# Patient Record
Sex: FEMALE | Race: WHITE | Employment: OTHER | URBAN - METROPOLITAN AREA
[De-identification: names, ages, dates, MRNs, and addresses within clinical notes are randomized per-mention and may not be internally consistent; named-entity substitution may affect disease eponyms.]

---

## 2018-09-14 ENCOUNTER — OFFICE VISIT (OUTPATIENT)
Dept: FAMILY MEDICINE CLINIC | Age: 33
End: 2018-09-14
Payer: COMMERCIAL

## 2018-09-14 ENCOUNTER — HOSPITAL ENCOUNTER (OUTPATIENT)
Age: 33
Discharge: HOME | End: 2018-09-16
Payer: COMMERCIAL

## 2018-09-14 VITALS
HEIGHT: 69 IN | HEART RATE: 82 BPM | BODY MASS INDEX: 38.8 KG/M2 | DIASTOLIC BLOOD PRESSURE: 78 MMHG | SYSTOLIC BLOOD PRESSURE: 110 MMHG | RESPIRATION RATE: 16 BRPM | WEIGHT: 262 LBS

## 2018-09-14 DIAGNOSIS — Z01.419 WELL WOMAN EXAM: ICD-10-CM

## 2018-09-14 DIAGNOSIS — E28.2 PCOS (POLYCYSTIC OVARIAN SYNDROME): Primary | ICD-10-CM

## 2018-09-14 PROCEDURE — 87624 HPV HI-RISK TYP POOLED RSLT: CPT

## 2018-09-14 PROCEDURE — 76830 TRANSVAGINAL US NON-OB: CPT | Performed by: FAMILY MEDICINE

## 2018-09-14 PROCEDURE — G0123 SCREEN CERV/VAG THIN LAYER: HCPCS

## 2018-09-14 PROCEDURE — 99213 OFFICE O/P EST LOW 20 MIN: CPT | Performed by: FAMILY MEDICINE

## 2018-09-14 ASSESSMENT — PATIENT HEALTH QUESTIONNAIRE - PHQ9
SUM OF ALL RESPONSES TO PHQ9 QUESTIONS 1 & 2: 0
SUM OF ALL RESPONSES TO PHQ QUESTIONS 1-9: 0
SUM OF ALL RESPONSES TO PHQ QUESTIONS 1-9: 0
2. FEELING DOWN, DEPRESSED OR HOPELESS: 0
SUM OF ALL RESPONSES TO PHQ9 QUESTIONS 1 & 2: 0
SUM OF ALL RESPONSES TO PHQ QUESTIONS 1-9: 0
1. LITTLE INTEREST OR PLEASURE IN DOING THINGS: 0
2. FEELING DOWN, DEPRESSED OR HOPELESS: 0
SUM OF ALL RESPONSES TO PHQ QUESTIONS 1-9: 0
1. LITTLE INTEREST OR PLEASURE IN DOING THINGS: 0

## 2018-09-14 NOTE — PROGRESS NOTES
Last pregnancy after ovulation induction with clomiphene  Delivered by  no complications  Now trying again for about 9-10 months  Periods every 32 days  LMP    July  Urine ovulation test is questionable  No hirsutism no acne  No galactorrhea  Weight prepregnancy was 225 lbs  Now 262 lbs    No pelvic infections or STI  Same partner     Gynecological examination :    General appearance : healthy. Obese No hirsutism or AN. Thyroid : no goiter. Skin tags around the neck and axillae  Breasts : no masses, no skin changes or retraction. No nipple discharge. Lymph nodes : no axillary or supraclavicular lymphadenopathy. Abdomen : soft. No organomegaly or masses. V&V : normal female external genitalia. No lesions or abnormal discharge. BUS : normal. No cysts or discharge. PS : adequate. No cystocele, no rectocele and no uterine prolapse. Cervix : no lesions. Pap done  Uterus : normal size. Adnexa : free. No masses or tenderness. No nodularities in posterior cul-de-sac. Rectal examination : not done. A/P  Chronic anovulation secondary to mild PCO S  Labs  Follow up day 2-5 next cycle                                       [address] [place] [address] Res[facility] Program                                                           Transvaginal Ultrasonography Report          Indication(s) : Irregular menses and chronic anovulation. Findings: The uterus is normal in size, shape and appearance. The midline echo is visualized from the cervix to the fundus. The endometrial stripe appearance is unremarkable and its thickness at the fundus is 8.5 mm . The AP diameter measures 4.7 cm. The longitudinal axis measures 8.7 cm. The transverse diameter measures 4.5 cm. The right ovary was not visualized  The left ovary is normal in size and appearance, measuring 2.5 x 1.8  cm. There are numerous follicles noted . There are no ovarian cysts or masses.   There are no adnexal cysts

## 2018-09-20 LAB
CORRESPONDING PAP CASE #: NORMAL
HPV, HIGH RISK: NEGATIVE

## 2018-10-12 ENCOUNTER — OFFICE VISIT (OUTPATIENT)
Dept: FAMILY MEDICINE CLINIC | Age: 33
End: 2018-10-12
Payer: COMMERCIAL

## 2018-10-12 VITALS — RESPIRATION RATE: 18 BRPM | WEIGHT: 262 LBS | BODY MASS INDEX: 38.69 KG/M2 | HEART RATE: 70 BPM

## 2018-10-12 DIAGNOSIS — E28.2 PCOS (POLYCYSTIC OVARIAN SYNDROME): Primary | ICD-10-CM

## 2018-10-12 PROCEDURE — 99213 OFFICE O/P EST LOW 20 MIN: CPT | Performed by: FAMILY MEDICINE

## 2018-10-12 NOTE — PROGRESS NOTES
Follow up of infertility  History of PCOS  LMP 10 days ago    Vag US  The uterus is normal size shape and configuration. ET 8 mm  Appearance of the endometrium is consistent with late follicular phase with mostly trilaminar appearance and some hyperechogenicity. Dominant follicle left ovary    A/P  Secondary infertility  Progesterone level next week corresponding to a day 20 of the cycle  MVI with folate. We will initiate on medication induction at the next visit. Patient will call on day 1 of her next cycle.

## 2018-10-20 LAB
PROLACTIN: 14.8
TSH SERPL DL<=0.05 MIU/L-ACNC: 2.38 UIU/ML

## 2018-11-20 ENCOUNTER — OFFICE VISIT (OUTPATIENT)
Dept: FAMILY MEDICINE CLINIC | Age: 33
End: 2018-11-20
Payer: COMMERCIAL

## 2018-11-20 DIAGNOSIS — E28.2 PCOS (POLYCYSTIC OVARIAN SYNDROME): Primary | ICD-10-CM

## 2018-11-20 PROCEDURE — 76857 US EXAM PELVIC LIMITED: CPT | Performed by: FAMILY MEDICINE

## 2018-11-20 PROCEDURE — 99212 OFFICE O/P EST SF 10 MIN: CPT | Performed by: FAMILY MEDICINE

## 2018-11-20 NOTE — PROGRESS NOTES
Here for ovulation induction  LMP  Patient fully informed about risks of ovarian cysts and of multiple pregnancy. Agrees to proceed    Vaginal US  Small right ovarian cyst likely CL  Diameter 30 mm  Uterus upper normal size    A/P  Ovcon 35  Follow up day 2-5 of next cycle or prn.

## 2018-12-18 ENCOUNTER — OFFICE VISIT (OUTPATIENT)
Dept: FAMILY MEDICINE CLINIC | Age: 33
End: 2018-12-18

## 2018-12-18 DIAGNOSIS — E28.2 PCOS (POLYCYSTIC OVARIAN SYNDROME): Primary | ICD-10-CM

## 2018-12-18 PROCEDURE — 99212 OFFICE O/P EST SF 10 MIN: CPT | Performed by: FAMILY MEDICINE

## 2019-01-18 ENCOUNTER — OFFICE VISIT (OUTPATIENT)
Dept: FAMILY MEDICINE CLINIC | Age: 34
End: 2019-01-18
Payer: COMMERCIAL

## 2019-01-18 VITALS
RESPIRATION RATE: 18 BRPM | SYSTOLIC BLOOD PRESSURE: 122 MMHG | DIASTOLIC BLOOD PRESSURE: 80 MMHG | WEIGHT: 262 LBS | BODY MASS INDEX: 38.69 KG/M2 | HEART RATE: 64 BPM

## 2019-01-18 DIAGNOSIS — E28.2 PCOS (POLYCYSTIC OVARIAN SYNDROME): Primary | ICD-10-CM

## 2019-01-18 DIAGNOSIS — E28.2 PCOS (POLYCYSTIC OVARIAN SYNDROME): ICD-10-CM

## 2019-01-18 PROCEDURE — 99212 OFFICE O/P EST SF 10 MIN: CPT | Performed by: FAMILY MEDICINE

## 2019-01-18 PROCEDURE — G8484 FLU IMMUNIZE NO ADMIN: HCPCS | Performed by: FAMILY MEDICINE

## 2019-01-18 PROCEDURE — G8417 CALC BMI ABV UP PARAM F/U: HCPCS | Performed by: FAMILY MEDICINE

## 2019-01-18 PROCEDURE — G8427 DOCREV CUR MEDS BY ELIG CLIN: HCPCS | Performed by: FAMILY MEDICINE

## 2019-01-18 PROCEDURE — 1036F TOBACCO NON-USER: CPT | Performed by: FAMILY MEDICINE

## 2019-01-18 ASSESSMENT — PATIENT HEALTH QUESTIONNAIRE - PHQ9
SUM OF ALL RESPONSES TO PHQ QUESTIONS 1-9: 0
SUM OF ALL RESPONSES TO PHQ QUESTIONS 1-9: 0
1. LITTLE INTEREST OR PLEASURE IN DOING THINGS: 0
2. FEELING DOWN, DEPRESSED OR HOPELESS: 0
SUM OF ALL RESPONSES TO PHQ9 QUESTIONS 1 & 2: 0

## 2019-03-14 ENCOUNTER — OFFICE VISIT (OUTPATIENT)
Dept: FAMILY MEDICINE CLINIC | Age: 34
End: 2019-03-14
Payer: COMMERCIAL

## 2019-03-14 VITALS
HEIGHT: 69 IN | SYSTOLIC BLOOD PRESSURE: 103 MMHG | HEART RATE: 76 BPM | RESPIRATION RATE: 16 BRPM | WEIGHT: 253 LBS | DIASTOLIC BLOOD PRESSURE: 77 MMHG | BODY MASS INDEX: 37.47 KG/M2

## 2019-03-14 DIAGNOSIS — E28.2 PCOS (POLYCYSTIC OVARIAN SYNDROME): Primary | ICD-10-CM

## 2019-03-14 PROCEDURE — 1036F TOBACCO NON-USER: CPT | Performed by: FAMILY MEDICINE

## 2019-03-14 PROCEDURE — G8417 CALC BMI ABV UP PARAM F/U: HCPCS | Performed by: FAMILY MEDICINE

## 2019-03-14 PROCEDURE — 76857 US EXAM PELVIC LIMITED: CPT | Performed by: FAMILY MEDICINE

## 2019-03-14 PROCEDURE — G8427 DOCREV CUR MEDS BY ELIG CLIN: HCPCS | Performed by: FAMILY MEDICINE

## 2019-03-14 PROCEDURE — 99213 OFFICE O/P EST LOW 20 MIN: CPT | Performed by: FAMILY MEDICINE

## 2019-03-14 PROCEDURE — G8484 FLU IMMUNIZE NO ADMIN: HCPCS | Performed by: FAMILY MEDICINE

## 2019-03-14 ASSESSMENT — PATIENT HEALTH QUESTIONNAIRE - PHQ9
2. FEELING DOWN, DEPRESSED OR HOPELESS: 0
1. LITTLE INTEREST OR PLEASURE IN DOING THINGS: 0
SUM OF ALL RESPONSES TO PHQ QUESTIONS 1-9: 0
SUM OF ALL RESPONSES TO PHQ9 QUESTIONS 1 & 2: 0
SUM OF ALL RESPONSES TO PHQ QUESTIONS 1-9: 0

## 2019-04-08 DIAGNOSIS — E28.2 PCOS (POLYCYSTIC OVARIAN SYNDROME): ICD-10-CM

## 2019-04-25 ENCOUNTER — TELEPHONE (OUTPATIENT)
Dept: FAMILY MEDICINE CLINIC | Age: 34
End: 2019-04-25

## 2019-04-25 ENCOUNTER — OFFICE VISIT (OUTPATIENT)
Dept: FAMILY MEDICINE CLINIC | Age: 34
End: 2019-04-25
Payer: COMMERCIAL

## 2019-04-25 VITALS
SYSTOLIC BLOOD PRESSURE: 107 MMHG | OXYGEN SATURATION: 96 % | DIASTOLIC BLOOD PRESSURE: 71 MMHG | BODY MASS INDEX: 36.62 KG/M2 | WEIGHT: 248 LBS | RESPIRATION RATE: 18 BRPM | HEART RATE: 88 BPM

## 2019-04-25 DIAGNOSIS — R11.0 NAUSEA: ICD-10-CM

## 2019-04-25 DIAGNOSIS — Z34.90 INTRAUTERINE PREGNANCY: Primary | ICD-10-CM

## 2019-04-25 PROCEDURE — G8427 DOCREV CUR MEDS BY ELIG CLIN: HCPCS | Performed by: FAMILY MEDICINE

## 2019-04-25 PROCEDURE — G8417 CALC BMI ABV UP PARAM F/U: HCPCS | Performed by: FAMILY MEDICINE

## 2019-04-25 PROCEDURE — 1036F TOBACCO NON-USER: CPT | Performed by: FAMILY MEDICINE

## 2019-04-25 PROCEDURE — 99213 OFFICE O/P EST LOW 20 MIN: CPT | Performed by: FAMILY MEDICINE

## 2019-04-25 ASSESSMENT — PATIENT HEALTH QUESTIONNAIRE - PHQ9
2. FEELING DOWN, DEPRESSED OR HOPELESS: 0
SUM OF ALL RESPONSES TO PHQ QUESTIONS 1-9: 0
1. LITTLE INTEREST OR PLEASURE IN DOING THINGS: 0
SUM OF ALL RESPONSES TO PHQ QUESTIONS 1-9: 0
SUM OF ALL RESPONSES TO PHQ9 QUESTIONS 1 & 2: 0

## 2019-05-30 ENCOUNTER — HOSPITAL ENCOUNTER (OUTPATIENT)
Age: 34
Discharge: HOME | End: 2019-06-01

## 2019-05-30 DIAGNOSIS — N91.5 OLIGOMENORRHEA, UNSPECIFIED TYPE: ICD-10-CM

## 2019-05-30 LAB
BILIRUBIN URINE: NEGATIVE
BLOOD, URINE: NEGATIVE
CLARITY: CLEAR
COLOR: YELLOW
GLUCOSE URINE: NEGATIVE MG/DL
HCT VFR BLD CALC: 37.4 % (ref 34–48)
HEMOGLOBIN: 11.4 G/DL (ref 11.5–15.5)
KETONES, URINE: NEGATIVE MG/DL
LEUKOCYTE ESTERASE, URINE: NEGATIVE
MCH RBC QN AUTO: 27 PG (ref 26–35)
MCHC RBC AUTO-ENTMCNC: 30.5 % (ref 32–34.5)
MCV RBC AUTO: 88.6 FL (ref 80–99.9)
NITRITE, URINE: NEGATIVE
PDW BLD-RTO: 15.9 FL (ref 11.5–15)
PH UA: 7 (ref 5–9)
PLATELET # BLD: 175 E9/L (ref 130–450)
PMV BLD AUTO: 11.5 FL (ref 7–12)
PROTEIN UA: NEGATIVE MG/DL
RBC # BLD: 4.22 E12/L (ref 3.5–5.5)
SPECIFIC GRAVITY UA: 1.01 (ref 1–1.03)
TSH SERPL DL<=0.05 MIU/L-ACNC: 2.17 UIU/ML (ref 0.27–4.2)
UROBILINOGEN, URINE: 0.2 E.U./DL
WBC # BLD: 8.2 E9/L (ref 4.5–11.5)

## 2019-05-30 PROCEDURE — 83036 HEMOGLOBIN GLYCOSYLATED A1C: CPT

## 2019-05-30 PROCEDURE — 86592 SYPHILIS TEST NON-TREP QUAL: CPT

## 2019-05-30 PROCEDURE — 86787 VARICELLA-ZOSTER ANTIBODY: CPT

## 2019-05-30 PROCEDURE — 87088 URINE BACTERIA CULTURE: CPT

## 2019-05-30 PROCEDURE — 81003 URINALYSIS AUTO W/O SCOPE: CPT

## 2019-05-30 PROCEDURE — 84443 ASSAY THYROID STIM HORMONE: CPT

## 2019-05-30 PROCEDURE — 87340 HEPATITIS B SURFACE AG IA: CPT

## 2019-05-30 PROCEDURE — 85027 COMPLETE CBC AUTOMATED: CPT

## 2019-05-30 PROCEDURE — 86901 BLOOD TYPING SEROLOGIC RH(D): CPT

## 2019-05-30 PROCEDURE — 86703 HIV-1/HIV-2 1 RESULT ANTBDY: CPT

## 2019-05-30 PROCEDURE — 86850 RBC ANTIBODY SCREEN: CPT

## 2019-05-30 PROCEDURE — 86762 RUBELLA ANTIBODY: CPT

## 2019-05-30 PROCEDURE — 86900 BLOOD TYPING SEROLOGIC ABO: CPT

## 2019-05-31 LAB
ABO/RH: NORMAL
ANTIBODY SCREEN: NORMAL
HBA1C MFR BLD: 5.3 % (ref 4–5.6)
RPR: NORMAL

## 2019-06-01 LAB — URINE CULTURE, ROUTINE: NORMAL

## 2019-06-02 LAB — HEPATITIS B SURFACE ANTIGEN INTERPRETATION: NORMAL

## 2019-06-03 LAB — HIV-1 AND HIV-2 ANTIBODIES: NORMAL

## 2019-06-04 LAB
RUBELLA ANTIBODY IGG: NORMAL
VARICELLA-ZOSTER VIRUS AB, IGG: NORMAL

## 2019-06-28 ENCOUNTER — HOSPITAL ENCOUNTER (OUTPATIENT)
Age: 34
Discharge: HOME | End: 2019-06-30

## 2019-06-28 DIAGNOSIS — O99.212 OBESITY AFFECTING PREGNANCY IN SECOND TRIMESTER: ICD-10-CM

## 2019-06-28 LAB
GLUCOSE TOLERANCE TEST 1 HOUR: 145 MG/DL
GLUCOSE TOLERANCE TEST 2 HOUR: 117 MG/DL
GLUCOSE TOLERANCE TEST FASTING: 81 MG/DL

## 2019-06-28 PROCEDURE — 82951 GLUCOSE TOLERANCE TEST (GTT): CPT

## 2019-09-18 ENCOUNTER — HOSPITAL ENCOUNTER (OUTPATIENT)
Age: 34
Discharge: HOME | End: 2019-09-20

## 2019-09-18 DIAGNOSIS — Z34.82 MULTIGRAVIDA IN SECOND TRIMESTER: ICD-10-CM

## 2019-09-18 LAB
GLUCOSE TOLERANCE TEST 1 HOUR: 149 MG/DL
GLUCOSE TOLERANCE TEST 2 HOUR: 174 MG/DL
GLUCOSE TOLERANCE TEST FASTING: 84 MG/DL
HCT VFR BLD CALC: 32.4 % (ref 34–48)
HEMOGLOBIN: 10 G/DL (ref 11.5–15.5)
MCH RBC QN AUTO: 29.1 PG (ref 26–35)
MCHC RBC AUTO-ENTMCNC: 30.9 % (ref 32–34.5)
MCV RBC AUTO: 94.2 FL (ref 80–99.9)
PDW BLD-RTO: 14.4 FL (ref 11.5–15)
PLATELET # BLD: 144 E9/L (ref 130–450)
PMV BLD AUTO: 11.7 FL (ref 7–12)
RBC # BLD: 3.44 E12/L (ref 3.5–5.5)
WBC # BLD: 10.3 E9/L (ref 4.5–11.5)

## 2019-09-18 PROCEDURE — 82951 GLUCOSE TOLERANCE TEST (GTT): CPT

## 2019-09-18 PROCEDURE — 85027 COMPLETE CBC AUTOMATED: CPT

## 2019-09-18 PROCEDURE — 86900 BLOOD TYPING SEROLOGIC ABO: CPT

## 2019-09-18 PROCEDURE — 86901 BLOOD TYPING SEROLOGIC RH(D): CPT

## 2019-09-18 PROCEDURE — 86850 RBC ANTIBODY SCREEN: CPT

## 2019-09-19 LAB
ABO/RH: NORMAL
ANTIBODY SCREEN: NORMAL

## 2019-10-27 ENCOUNTER — HOSPITAL ENCOUNTER (OUTPATIENT)
Age: 34
Setting detail: OBSERVATION
Discharge: HOME | End: 2019-10-27
Attending: OBSTETRICS & GYNECOLOGY | Admitting: OBSTETRICS & GYNECOLOGY

## 2019-10-27 VITALS
RESPIRATION RATE: 18 BRPM | HEIGHT: 68 IN | DIASTOLIC BLOOD PRESSURE: 82 MMHG | HEART RATE: 93 BPM | BODY MASS INDEX: 40.16 KG/M2 | WEIGHT: 265 LBS | SYSTOLIC BLOOD PRESSURE: 139 MMHG | TEMPERATURE: 99.4 F

## 2019-10-27 PROBLEM — Z34.93 PREGNANT AND NOT YET DELIVERED IN THIRD TRIMESTER: Status: ACTIVE | Noted: 2019-10-27

## 2019-10-27 LAB
AMNISURE, POC: NEGATIVE
Lab: NORMAL
NEGATIVE QC PASS/FAIL: NORMAL
POSITIVE QC PASS/FAIL: NORMAL

## 2019-10-27 PROCEDURE — G0378 HOSPITAL OBSERVATION PER HR: HCPCS

## 2019-10-27 PROCEDURE — 84112 EVAL AMNIOTIC FLUID PROTEIN: CPT

## 2019-10-27 PROCEDURE — G0379 DIRECT REFER HOSPITAL OBSERV: HCPCS

## 2019-10-27 PROCEDURE — 99211 OFF/OP EST MAY X REQ PHY/QHP: CPT

## 2019-11-11 ENCOUNTER — HOSPITAL ENCOUNTER (OUTPATIENT)
Age: 34
Discharge: HOME | End: 2019-11-13

## 2019-11-11 DIAGNOSIS — Z34.82 MULTIGRAVIDA IN SECOND TRIMESTER: ICD-10-CM

## 2019-11-11 PROCEDURE — 87491 CHLMYD TRACH DNA AMP PROBE: CPT

## 2019-11-11 PROCEDURE — 87591 N.GONORRHOEAE DNA AMP PROB: CPT

## 2019-11-11 PROCEDURE — 85027 COMPLETE CBC AUTOMATED: CPT

## 2019-11-11 PROCEDURE — 87081 CULTURE SCREEN ONLY: CPT

## 2019-11-11 PROCEDURE — 85025 COMPLETE CBC W/AUTO DIFF WBC: CPT

## 2019-11-13 LAB
BASOPHILS ABSOLUTE: ABNORMAL E9/L (ref 0–0.2)
BASOPHILS RELATIVE PERCENT: ABNORMAL % (ref 0–2)
EOSINOPHILS ABSOLUTE: ABNORMAL E9/L (ref 0.05–0.5)
EOSINOPHILS RELATIVE PERCENT: ABNORMAL % (ref 0–6)
HCT VFR BLD CALC: 35.2 % (ref 34–48)
HEMOGLOBIN: 10.6 G/DL (ref 11.5–15.5)
IMMATURE GRANULOCYTES #: ABNORMAL E9/L
IMMATURE GRANULOCYTES %: ABNORMAL % (ref 0–5)
LYMPHOCYTES ABSOLUTE: ABNORMAL E9/L (ref 1.5–4)
LYMPHOCYTES RELATIVE PERCENT: ABNORMAL % (ref 20–42)
MCH RBC QN AUTO: 27.5 PG (ref 26–35)
MCHC RBC AUTO-ENTMCNC: 30.1 % (ref 32–34.5)
MCV RBC AUTO: 91.4 FL (ref 80–99.9)
MONOCYTES ABSOLUTE: ABNORMAL E9/L (ref 0.1–0.95)
MONOCYTES RELATIVE PERCENT: ABNORMAL % (ref 2–12)
NEUTROPHILS ABSOLUTE: ABNORMAL E9/L (ref 1.8–7.3)
NEUTROPHILS RELATIVE PERCENT: ABNORMAL % (ref 43–80)
PDW BLD-RTO: 15.1 FL (ref 11.5–15)
PLATELET # BLD: 146 E9/L (ref 130–450)
PMV BLD AUTO: 11.8 FL (ref 7–12)
RBC # BLD: 3.85 E12/L (ref 3.5–5.5)
WBC # BLD: 9.5 E9/L (ref 4.5–11.5)

## 2019-11-14 LAB
C TRACH DNA GENITAL QL NAA+PROBE: NEGATIVE
N. GONORRHOEAE DNA: NEGATIVE
SOURCE: NORMAL

## 2019-11-16 LAB — GROUP B STREP CULTURE: NORMAL

## 2019-11-25 PROBLEM — O99.019 IRON DEFICIENCY ANEMIA OF PREGNANCY: Status: ACTIVE | Noted: 2019-11-25

## 2019-11-25 PROBLEM — D50.9 IRON DEFICIENCY ANEMIA OF PREGNANCY: Status: ACTIVE | Noted: 2019-11-25

## 2019-11-25 PROBLEM — O34.219 PREVIOUS CESAREAN DELIVERY AFFECTING PREGNANCY: Status: ACTIVE | Noted: 2019-11-25

## 2019-11-25 PROBLEM — O34.219 DECLINES VBAC (VAGINAL BIRTH AFTER CESAREAN) TRIAL: Status: ACTIVE | Noted: 2019-11-25

## 2019-11-25 PROBLEM — O09.299 H/O SPONTANEOUS ABORTION, CURRENTLY PREGNANT: Status: ACTIVE | Noted: 2019-11-25

## 2019-11-25 PROBLEM — Z28.39 RUBELLA NON-IMMUNE STATUS, ANTEPARTUM: Status: ACTIVE | Noted: 2019-11-25

## 2019-11-25 PROBLEM — O99.213 OBESITY AFFECTING PREGNANCY IN THIRD TRIMESTER: Status: ACTIVE | Noted: 2019-11-25

## 2019-11-25 PROBLEM — O09.523 HIGH-RISK PREGNANCY, ELDERLY MULTIGRAVIDA, THIRD TRIMESTER: Status: ACTIVE | Noted: 2019-11-25

## 2019-11-25 PROBLEM — O09.899 RUBELLA NON-IMMUNE STATUS, ANTEPARTUM: Status: ACTIVE | Noted: 2019-11-25

## 2019-11-25 PROBLEM — O24.410 DIET CONTROLLED GESTATIONAL DIABETES MELLITUS (GDM) IN THIRD TRIMESTER: Status: ACTIVE | Noted: 2019-11-25

## 2019-12-01 ENCOUNTER — HOSPITAL ENCOUNTER (OUTPATIENT)
Age: 34
Discharge: HOME | End: 2019-12-01
Attending: OBSTETRICS & GYNECOLOGY | Admitting: OBSTETRICS & GYNECOLOGY

## 2019-12-01 VITALS — SYSTOLIC BLOOD PRESSURE: 135 MMHG | DIASTOLIC BLOOD PRESSURE: 64 MMHG | HEART RATE: 81 BPM

## 2019-12-01 PROBLEM — Z64.1 MULTIPARITY: Status: ACTIVE | Noted: 2019-12-01

## 2019-12-01 PROCEDURE — 99211 OFF/OP EST MAY X REQ PHY/QHP: CPT

## 2019-12-10 ENCOUNTER — HOSPITAL ENCOUNTER (INPATIENT)
Age: 34
LOS: 3 days | Discharge: HOME | End: 2019-12-13
Attending: OBSTETRICS & GYNECOLOGY | Admitting: OBSTETRICS & GYNECOLOGY

## 2019-12-10 ENCOUNTER — ANESTHESIA (OUTPATIENT)
Dept: LABOR AND DELIVERY | Age: 34
End: 2019-12-10

## 2019-12-10 ENCOUNTER — ANESTHESIA EVENT (OUTPATIENT)
Dept: LABOR AND DELIVERY | Age: 34
End: 2019-12-10

## 2019-12-10 VITALS
SYSTOLIC BLOOD PRESSURE: 151 MMHG | OXYGEN SATURATION: 98 % | RESPIRATION RATE: 1 BRPM | DIASTOLIC BLOOD PRESSURE: 90 MMHG

## 2019-12-10 DIAGNOSIS — O34.219: Primary | ICD-10-CM

## 2019-12-10 DIAGNOSIS — G89.18 POSTOPERATIVE PAIN: ICD-10-CM

## 2019-12-10 PROBLEM — Z64.1 MULTIPARITY: Status: RESOLVED | Noted: 2019-12-01 | Resolved: 2019-12-10

## 2019-12-10 PROBLEM — Z34.93 PREGNANT AND NOT YET DELIVERED IN THIRD TRIMESTER: Status: RESOLVED | Noted: 2019-10-27 | Resolved: 2019-12-10

## 2019-12-10 PROBLEM — Z3A.39 39 WEEKS GESTATION OF PREGNANCY: Status: ACTIVE | Noted: 2019-12-10

## 2019-12-10 LAB
ABO/RH: NORMAL
AMPHETAMINE SCREEN, URINE: NOT DETECTED
ANTIBODY SCREEN: NORMAL
BARBITURATE SCREEN URINE: NOT DETECTED
BENZODIAZEPINE SCREEN, URINE: NOT DETECTED
CANNABINOID SCREEN URINE: NOT DETECTED
COCAINE METABOLITE SCREEN URINE: NOT DETECTED
FENTANYL SCREEN, URINE: NOT DETECTED
HCT VFR BLD CALC: 32.8 % (ref 34–48)
HEMOGLOBIN: 10.4 G/DL (ref 11.5–15.5)
Lab: NORMAL
MCH RBC QN AUTO: 28 PG (ref 26–35)
MCHC RBC AUTO-ENTMCNC: 31.7 % (ref 32–34.5)
MCV RBC AUTO: 88.4 FL (ref 80–99.9)
METHADONE SCREEN, URINE: NOT DETECTED
OPIATE SCREEN URINE: NOT DETECTED
OXYCODONE URINE: NOT DETECTED
PDW BLD-RTO: 15 FL (ref 11.5–15)
PHENCYCLIDINE SCREEN URINE: NOT DETECTED
PLATELET # BLD: 145 E9/L (ref 130–450)
PMV BLD AUTO: 11.7 FL (ref 7–12)
RBC # BLD: 3.71 E12/L (ref 3.5–5.5)
WBC # BLD: 7.7 E9/L (ref 4.5–11.5)

## 2019-12-10 PROCEDURE — 7100000001 HC PACU RECOVERY - ADDTL 15 MIN: Performed by: OBSTETRICS & GYNECOLOGY

## 2019-12-10 PROCEDURE — 6360000002 HC RX W HCPCS: Performed by: ANESTHESIOLOGY

## 2019-12-10 PROCEDURE — 2580000003 HC RX 258: Performed by: OBSTETRICS & GYNECOLOGY

## 2019-12-10 PROCEDURE — 86900 BLOOD TYPING SEROLOGIC ABO: CPT

## 2019-12-10 PROCEDURE — 85027 COMPLETE CBC AUTOMATED: CPT

## 2019-12-10 PROCEDURE — 2500000003 HC RX 250 WO HCPCS: Performed by: OBSTETRICS & GYNECOLOGY

## 2019-12-10 PROCEDURE — 3700000000 HC ANESTHESIA ATTENDED CARE: Performed by: OBSTETRICS & GYNECOLOGY

## 2019-12-10 PROCEDURE — 6360000002 HC RX W HCPCS: Performed by: OBSTETRICS & GYNECOLOGY

## 2019-12-10 PROCEDURE — 3700000001 HC ADD 15 MINUTES (ANESTHESIA): Performed by: OBSTETRICS & GYNECOLOGY

## 2019-12-10 PROCEDURE — 86850 RBC ANTIBODY SCREEN: CPT

## 2019-12-10 PROCEDURE — 3609079900 HC CESAREAN SECTION: Performed by: OBSTETRICS & GYNECOLOGY

## 2019-12-10 PROCEDURE — 6370000000 HC RX 637 (ALT 250 FOR IP): Performed by: OBSTETRICS & GYNECOLOGY

## 2019-12-10 PROCEDURE — 86901 BLOOD TYPING SEROLOGIC RH(D): CPT

## 2019-12-10 PROCEDURE — 6360000002 HC RX W HCPCS

## 2019-12-10 PROCEDURE — 80307 DRUG TEST PRSMV CHEM ANLYZR: CPT

## 2019-12-10 PROCEDURE — 7100000000 HC PACU RECOVERY - FIRST 15 MIN: Performed by: OBSTETRICS & GYNECOLOGY

## 2019-12-10 PROCEDURE — 6370000000 HC RX 637 (ALT 250 FOR IP): Performed by: ANESTHESIOLOGY

## 2019-12-10 PROCEDURE — 6360000002 HC RX W HCPCS: Performed by: NURSE ANESTHETIST, CERTIFIED REGISTERED

## 2019-12-10 PROCEDURE — 36415 COLL VENOUS BLD VENIPUNCTURE: CPT

## 2019-12-10 PROCEDURE — 2500000003 HC RX 250 WO HCPCS: Performed by: NURSE ANESTHETIST, CERTIFIED REGISTERED

## 2019-12-10 PROCEDURE — 2709999900 HC NON-CHARGEABLE SUPPLY: Performed by: OBSTETRICS & GYNECOLOGY

## 2019-12-10 PROCEDURE — 1220000000 HC SEMI PRIVATE OB R&B

## 2019-12-10 RX ORDER — SODIUM CHLORIDE 0.9 % (FLUSH) 0.9 %
10 SYRINGE (ML) INJECTION PRN
Status: DISCONTINUED | OUTPATIENT
Start: 2019-12-10 | End: 2019-12-13 | Stop reason: HOSPADM

## 2019-12-10 RX ORDER — OXYCODONE HYDROCHLORIDE AND ACETAMINOPHEN 5; 325 MG/1; MG/1
1 TABLET ORAL EVERY 4 HOURS PRN
Status: DISCONTINUED | OUTPATIENT
Start: 2019-12-11 | End: 2019-12-13 | Stop reason: HOSPADM

## 2019-12-10 RX ORDER — IBUPROFEN 600 MG/1
600 TABLET ORAL EVERY 6 HOURS PRN
Status: DISCONTINUED | OUTPATIENT
Start: 2019-12-10 | End: 2019-12-13 | Stop reason: HOSPADM

## 2019-12-10 RX ORDER — OXYCODONE HYDROCHLORIDE AND ACETAMINOPHEN 5; 325 MG/1; MG/1
1 TABLET ORAL EVERY 4 HOURS PRN
Status: DISCONTINUED | OUTPATIENT
Start: 2019-12-10 | End: 2019-12-13 | Stop reason: HOSPADM

## 2019-12-10 RX ORDER — SIMETHICONE 80 MG
80 TABLET,CHEWABLE ORAL EVERY 6 HOURS PRN
Status: DISCONTINUED | OUTPATIENT
Start: 2019-12-10 | End: 2019-12-13 | Stop reason: HOSPADM

## 2019-12-10 RX ORDER — SUCCINYLCHOLINE/SOD CL,ISO/PF 200MG/10ML
SYRINGE (ML) INTRAVENOUS PRN
Status: DISCONTINUED | OUTPATIENT
Start: 2019-12-10 | End: 2019-12-10 | Stop reason: SDUPTHER

## 2019-12-10 RX ORDER — OXYCODONE HYDROCHLORIDE AND ACETAMINOPHEN 5; 325 MG/1; MG/1
1 TABLET ORAL
Status: DISCONTINUED | OUTPATIENT
Start: 2019-12-10 | End: 2019-12-10 | Stop reason: HOSPADM

## 2019-12-10 RX ORDER — PROMETHAZINE HYDROCHLORIDE 25 MG/ML
6.25 INJECTION, SOLUTION INTRAMUSCULAR; INTRAVENOUS
Status: DISCONTINUED | OUTPATIENT
Start: 2019-12-10 | End: 2019-12-10 | Stop reason: HOSPADM

## 2019-12-10 RX ORDER — FENTANYL CITRATE 50 UG/ML
50 INJECTION, SOLUTION INTRAMUSCULAR; INTRAVENOUS EVERY 5 MIN PRN
Status: DISCONTINUED | OUTPATIENT
Start: 2019-12-10 | End: 2019-12-10 | Stop reason: HOSPADM

## 2019-12-10 RX ORDER — TRISODIUM CITRATE DIHYDRATE AND CITRIC ACID MONOHYDRATE 500; 334 MG/5ML; MG/5ML
30 SOLUTION ORAL ONCE
Status: COMPLETED | OUTPATIENT
Start: 2019-12-10 | End: 2019-12-10

## 2019-12-10 RX ORDER — FENTANYL CITRATE 50 UG/ML
25 INJECTION, SOLUTION INTRAMUSCULAR; INTRAVENOUS EVERY 5 MIN PRN
Status: DISCONTINUED | OUTPATIENT
Start: 2019-12-10 | End: 2019-12-10 | Stop reason: HOSPADM

## 2019-12-10 RX ORDER — SODIUM CHLORIDE, SODIUM LACTATE, POTASSIUM CHLORIDE, AND CALCIUM CHLORIDE .6; .31; .03; .02 G/100ML; G/100ML; G/100ML; G/100ML
1000 INJECTION, SOLUTION INTRAVENOUS ONCE
Status: COMPLETED | OUTPATIENT
Start: 2019-12-10 | End: 2019-12-10

## 2019-12-10 RX ORDER — LANOLIN 100 %
OINTMENT (GRAM) TOPICAL
Status: DISCONTINUED | OUTPATIENT
Start: 2019-12-10 | End: 2019-12-13 | Stop reason: HOSPADM

## 2019-12-10 RX ORDER — SODIUM CHLORIDE, SODIUM LACTATE, POTASSIUM CHLORIDE, CALCIUM CHLORIDE 600; 310; 30; 20 MG/100ML; MG/100ML; MG/100ML; MG/100ML
INJECTION, SOLUTION INTRAVENOUS CONTINUOUS
Status: DISCONTINUED | OUTPATIENT
Start: 2019-12-10 | End: 2019-12-10

## 2019-12-10 RX ORDER — HYDRALAZINE HYDROCHLORIDE 20 MG/ML
5 INJECTION INTRAMUSCULAR; INTRAVENOUS ONCE
Status: COMPLETED | OUTPATIENT
Start: 2019-12-10 | End: 2019-12-10

## 2019-12-10 RX ORDER — SODIUM CHLORIDE 0.9 % (FLUSH) 0.9 %
10 SYRINGE (ML) INJECTION EVERY 12 HOURS SCHEDULED
Status: DISCONTINUED | OUTPATIENT
Start: 2019-12-10 | End: 2019-12-10

## 2019-12-10 RX ORDER — CEFAZOLIN SODIUM 1 G/50ML
1 SOLUTION INTRAVENOUS EVERY 8 HOURS
Status: COMPLETED | OUTPATIENT
Start: 2019-12-10 | End: 2019-12-11

## 2019-12-10 RX ORDER — MORPHINE SULFATE 10 MG/ML
INJECTION, SOLUTION INTRAMUSCULAR; INTRAVENOUS PRN
Status: DISCONTINUED | OUTPATIENT
Start: 2019-12-10 | End: 2019-12-10 | Stop reason: SDUPTHER

## 2019-12-10 RX ORDER — PROPOFOL 10 MG/ML
INJECTION, EMULSION INTRAVENOUS PRN
Status: DISCONTINUED | OUTPATIENT
Start: 2019-12-10 | End: 2019-12-10 | Stop reason: SDUPTHER

## 2019-12-10 RX ORDER — MEPERIDINE HYDROCHLORIDE 25 MG/ML
12.5 INJECTION INTRAMUSCULAR; INTRAVENOUS; SUBCUTANEOUS EVERY 5 MIN PRN
Status: DISCONTINUED | OUTPATIENT
Start: 2019-12-10 | End: 2019-12-10 | Stop reason: HOSPADM

## 2019-12-10 RX ORDER — METHYLERGONOVINE MALEATE 0.2 MG/ML
200 INJECTION INTRAVENOUS PRN
Status: DISCONTINUED | OUTPATIENT
Start: 2019-12-10 | End: 2019-12-13 | Stop reason: HOSPADM

## 2019-12-10 RX ORDER — FENTANYL CITRATE 50 UG/ML
INJECTION, SOLUTION INTRAMUSCULAR; INTRAVENOUS PRN
Status: DISCONTINUED | OUTPATIENT
Start: 2019-12-10 | End: 2019-12-10 | Stop reason: SDUPTHER

## 2019-12-10 RX ORDER — PRENATAL WITH FERROUS FUM AND FOLIC ACID 3080; 920; 120; 400; 22; 1.84; 3; 20; 10; 1; 12; 200; 27; 25; 2 [IU]/1; [IU]/1; MG/1; [IU]/1; MG/1; MG/1; MG/1; MG/1; MG/1; MG/1; UG/1; MG/1; MG/1; MG/1; MG/1
1 TABLET ORAL
Status: DISCONTINUED | OUTPATIENT
Start: 2019-12-11 | End: 2019-12-13 | Stop reason: HOSPADM

## 2019-12-10 RX ORDER — DIPHENHYDRAMINE HYDROCHLORIDE 50 MG/ML
25 INJECTION INTRAMUSCULAR; INTRAVENOUS EVERY 6 HOURS PRN
Status: DISCONTINUED | OUTPATIENT
Start: 2019-12-10 | End: 2019-12-13 | Stop reason: HOSPADM

## 2019-12-10 RX ORDER — CEFAZOLIN SODIUM 2 G/50ML
SOLUTION INTRAVENOUS
Status: COMPLETED
Start: 2019-12-10 | End: 2019-12-10

## 2019-12-10 RX ORDER — METRONIDAZOLE 500 MG/1
500 TABLET ORAL EVERY 8 HOURS SCHEDULED
Status: COMPLETED | OUTPATIENT
Start: 2019-12-10 | End: 2019-12-12

## 2019-12-10 RX ORDER — OXYCODONE HYDROCHLORIDE AND ACETAMINOPHEN 5; 325 MG/1; MG/1
2 TABLET ORAL EVERY 4 HOURS PRN
Status: DISCONTINUED | OUTPATIENT
Start: 2019-12-11 | End: 2019-12-13 | Stop reason: HOSPADM

## 2019-12-10 RX ORDER — SODIUM CHLORIDE 0.9 % (FLUSH) 0.9 %
10 SYRINGE (ML) INJECTION PRN
Status: DISCONTINUED | OUTPATIENT
Start: 2019-12-10 | End: 2019-12-10

## 2019-12-10 RX ORDER — SODIUM CHLORIDE 0.9 % (FLUSH) 0.9 %
10 SYRINGE (ML) INJECTION EVERY 12 HOURS SCHEDULED
Status: DISCONTINUED | OUTPATIENT
Start: 2019-12-10 | End: 2019-12-13 | Stop reason: HOSPADM

## 2019-12-10 RX ORDER — SODIUM CHLORIDE, SODIUM LACTATE, POTASSIUM CHLORIDE, CALCIUM CHLORIDE 600; 310; 30; 20 MG/100ML; MG/100ML; MG/100ML; MG/100ML
INJECTION, SOLUTION INTRAVENOUS CONTINUOUS
Status: DISCONTINUED | OUTPATIENT
Start: 2019-12-10 | End: 2019-12-13 | Stop reason: HOSPADM

## 2019-12-10 RX ORDER — DOCUSATE SODIUM 100 MG/1
100 CAPSULE, LIQUID FILLED ORAL 2 TIMES DAILY
Status: DISCONTINUED | OUTPATIENT
Start: 2019-12-10 | End: 2019-12-13 | Stop reason: HOSPADM

## 2019-12-10 RX ORDER — METHYLERGONOVINE MALEATE 0.2 MG/ML
INJECTION INTRAVENOUS PRN
Status: DISCONTINUED | OUTPATIENT
Start: 2019-12-10 | End: 2019-12-10 | Stop reason: SDUPTHER

## 2019-12-10 RX ORDER — BISACODYL 10 MG
10 SUPPOSITORY, RECTAL RECTAL DAILY PRN
Status: DISCONTINUED | OUTPATIENT
Start: 2019-12-12 | End: 2019-12-13 | Stop reason: HOSPADM

## 2019-12-10 RX ORDER — FERROUS SULFATE 325(65) MG
325 TABLET ORAL 2 TIMES DAILY WITH MEALS
Status: DISCONTINUED | OUTPATIENT
Start: 2019-12-11 | End: 2019-12-13 | Stop reason: HOSPADM

## 2019-12-10 RX ORDER — DIPHENHYDRAMINE HYDROCHLORIDE 50 MG/ML
12.5 INJECTION INTRAMUSCULAR; INTRAVENOUS
Status: DISCONTINUED | OUTPATIENT
Start: 2019-12-10 | End: 2019-12-10 | Stop reason: HOSPADM

## 2019-12-10 RX ADMIN — SODIUM CHLORIDE, POTASSIUM CHLORIDE, SODIUM LACTATE AND CALCIUM CHLORIDE: 600; 310; 30; 20 INJECTION, SOLUTION INTRAVENOUS at 15:44

## 2019-12-10 RX ADMIN — MORPHINE SULFATE 2 MG: 10 INJECTION INTRAVENOUS at 16:09

## 2019-12-10 RX ADMIN — MORPHINE SULFATE 3 MG: 10 INJECTION INTRAVENOUS at 16:01

## 2019-12-10 RX ADMIN — SODIUM CHLORIDE, POTASSIUM CHLORIDE, SODIUM LACTATE AND CALCIUM CHLORIDE 1000 ML: 600; 310; 30; 20 INJECTION, SOLUTION INTRAVENOUS at 13:30

## 2019-12-10 RX ADMIN — Medication: at 23:29

## 2019-12-10 RX ADMIN — CEFAZOLIN SODIUM 1 G: 1 SOLUTION INTRAVENOUS at 23:37

## 2019-12-10 RX ADMIN — SODIUM CHLORIDE, POTASSIUM CHLORIDE, SODIUM LACTATE AND CALCIUM CHLORIDE: 600; 310; 30; 20 INJECTION, SOLUTION INTRAVENOUS at 15:01

## 2019-12-10 RX ADMIN — PROPOFOL 2000 MG: 10 INJECTION, EMULSION INTRAVENOUS at 15:30

## 2019-12-10 RX ADMIN — FENTANYL CITRATE 50 MCG: 50 INJECTION, SOLUTION INTRAMUSCULAR; INTRAVENOUS at 15:41

## 2019-12-10 RX ADMIN — HYDROMORPHONE HYDROCHLORIDE 0.5 MG: 1 INJECTION, SOLUTION INTRAMUSCULAR; INTRAVENOUS; SUBCUTANEOUS at 17:13

## 2019-12-10 RX ADMIN — METHYLERGONOVINE MALEATE 200 MCG: 0.2 INJECTION INTRAMUSCULAR; INTRAVENOUS at 15:42

## 2019-12-10 RX ADMIN — METRONIDAZOLE 500 MG: 500 TABLET, FILM COATED ORAL at 21:47

## 2019-12-10 RX ADMIN — FAMOTIDINE 20 MG: 10 INJECTION, SOLUTION INTRAVENOUS at 21:45

## 2019-12-10 RX ADMIN — Medication 140 MG: at 15:30

## 2019-12-10 RX ADMIN — CEFAZOLIN SODIUM: 2 SOLUTION INTRAVENOUS at 14:55

## 2019-12-10 RX ADMIN — HYDRALAZINE HYDROCHLORIDE 5 MG: 20 INJECTION INTRAMUSCULAR; INTRAVENOUS at 18:45

## 2019-12-10 RX ADMIN — HYDROMORPHONE HYDROCHLORIDE 0.5 MG: 1 INJECTION, SOLUTION INTRAMUSCULAR; INTRAVENOUS; SUBCUTANEOUS at 17:35

## 2019-12-10 RX ADMIN — MEPERIDINE HYDROCHLORIDE 12.5 MG: 25 INJECTION INTRAMUSCULAR; INTRAVENOUS; SUBCUTANEOUS at 18:07

## 2019-12-10 RX ADMIN — SODIUM CITRATE AND CITRIC ACID MONOHYDRATE 30 ML: 500; 334 SOLUTION ORAL at 14:38

## 2019-12-10 RX ADMIN — FENTANYL CITRATE 50 MCG: 50 INJECTION, SOLUTION INTRAMUSCULAR; INTRAVENOUS at 16:18

## 2019-12-10 RX ADMIN — OXYCODONE HYDROCHLORIDE AND ACETAMINOPHEN 1 TABLET: 5; 325 TABLET ORAL at 23:00

## 2019-12-10 RX ADMIN — Medication 999 ML/HR: at 15:40

## 2019-12-10 RX ADMIN — FENTANYL CITRATE 50 MCG: 50 INJECTION, SOLUTION INTRAMUSCULAR; INTRAVENOUS at 15:50

## 2019-12-10 ASSESSMENT — PULMONARY FUNCTION TESTS
PIF_VALUE: 0
PIF_VALUE: 0
PIF_VALUE: 10
PIF_VALUE: 0
PIF_VALUE: 23
PIF_VALUE: 0
PIF_VALUE: 23
PIF_VALUE: 23
PIF_VALUE: 0
PIF_VALUE: 10
PIF_VALUE: 10
PIF_VALUE: 24
PIF_VALUE: 4
PIF_VALUE: 9
PIF_VALUE: 0
PIF_VALUE: 10
PIF_VALUE: 10
PIF_VALUE: 11
PIF_VALUE: 21
PIF_VALUE: 0
PIF_VALUE: 10
PIF_VALUE: 1
PIF_VALUE: 0
PIF_VALUE: 12
PIF_VALUE: 8
PIF_VALUE: 11
PIF_VALUE: 23
PIF_VALUE: 0
PIF_VALUE: 4
PIF_VALUE: 23
PIF_VALUE: 10
PIF_VALUE: 10
PIF_VALUE: 11
PIF_VALUE: 0
PIF_VALUE: 22
PIF_VALUE: 24
PIF_VALUE: 0
PIF_VALUE: 0
PIF_VALUE: 24
PIF_VALUE: 3
PIF_VALUE: 0
PIF_VALUE: 0
PIF_VALUE: 4
PIF_VALUE: 0
PIF_VALUE: 10
PIF_VALUE: 10
PIF_VALUE: 23
PIF_VALUE: 0
PIF_VALUE: 23
PIF_VALUE: 0
PIF_VALUE: 24
PIF_VALUE: 2
PIF_VALUE: 0
PIF_VALUE: 11
PIF_VALUE: 21
PIF_VALUE: 0
PIF_VALUE: 12
PIF_VALUE: 10
PIF_VALUE: 12
PIF_VALUE: 9
PIF_VALUE: 11
PIF_VALUE: 0
PIF_VALUE: 9
PIF_VALUE: 22
PIF_VALUE: 9
PIF_VALUE: 0
PIF_VALUE: 22
PIF_VALUE: 10
PIF_VALUE: 1
PIF_VALUE: 11
PIF_VALUE: 11
PIF_VALUE: 23
PIF_VALUE: 9
PIF_VALUE: 21
PIF_VALUE: 7
PIF_VALUE: 10
PIF_VALUE: 0
PIF_VALUE: 0
PIF_VALUE: 10
PIF_VALUE: 10
PIF_VALUE: 0
PIF_VALUE: 3
PIF_VALUE: 9
PIF_VALUE: 0
PIF_VALUE: 11
PIF_VALUE: 11
PIF_VALUE: 0
PIF_VALUE: 3
PIF_VALUE: 9
PIF_VALUE: 0
PIF_VALUE: 11
PIF_VALUE: 24
PIF_VALUE: 0
PIF_VALUE: 11
PIF_VALUE: 23
PIF_VALUE: 0
PIF_VALUE: 20
PIF_VALUE: 0
PIF_VALUE: 0
PIF_VALUE: 10

## 2019-12-10 ASSESSMENT — PAIN SCALES - GENERAL
PAINLEVEL_OUTOF10: 8
PAINLEVEL_OUTOF10: 7
PAINLEVEL_OUTOF10: 9
PAINLEVEL_OUTOF10: 9

## 2019-12-11 PROBLEM — D62 POSTOPERATIVE ANEMIA DUE TO ACUTE BLOOD LOSS: Status: ACTIVE | Noted: 2019-12-11

## 2019-12-11 LAB
BASOPHILS ABSOLUTE: 0.02 E9/L (ref 0–0.2)
BASOPHILS RELATIVE PERCENT: 0.2 % (ref 0–2)
EOSINOPHILS ABSOLUTE: 0.02 E9/L (ref 0.05–0.5)
EOSINOPHILS RELATIVE PERCENT: 0.2 % (ref 0–6)
HCT VFR BLD CALC: 23.7 % (ref 34–48)
HEMOGLOBIN: 7.5 G/DL (ref 11.5–15.5)
IMMATURE GRANULOCYTES #: 0.04 E9/L
IMMATURE GRANULOCYTES %: 0.4 % (ref 0–5)
LYMPHOCYTES ABSOLUTE: 1.06 E9/L (ref 1.5–4)
LYMPHOCYTES RELATIVE PERCENT: 11.7 % (ref 20–42)
MCH RBC QN AUTO: 27.9 PG (ref 26–35)
MCHC RBC AUTO-ENTMCNC: 31.6 % (ref 32–34.5)
MCV RBC AUTO: 88.1 FL (ref 80–99.9)
MONOCYTES ABSOLUTE: 0.48 E9/L (ref 0.1–0.95)
MONOCYTES RELATIVE PERCENT: 5.3 % (ref 2–12)
NEUTROPHILS ABSOLUTE: 7.41 E9/L (ref 1.8–7.3)
NEUTROPHILS RELATIVE PERCENT: 82.2 % (ref 43–80)
PDW BLD-RTO: 14.9 FL (ref 11.5–15)
PLATELET # BLD: 105 E9/L (ref 130–450)
PMV BLD AUTO: 11.1 FL (ref 7–12)
RBC # BLD: 2.69 E12/L (ref 3.5–5.5)
WBC # BLD: 9 E9/L (ref 4.5–11.5)

## 2019-12-11 PROCEDURE — 1220000000 HC SEMI PRIVATE OB R&B

## 2019-12-11 PROCEDURE — 2500000003 HC RX 250 WO HCPCS: Performed by: OBSTETRICS & GYNECOLOGY

## 2019-12-11 PROCEDURE — 6370000000 HC RX 637 (ALT 250 FOR IP): Performed by: ANESTHESIOLOGY

## 2019-12-11 PROCEDURE — 6360000002 HC RX W HCPCS: Performed by: OBSTETRICS & GYNECOLOGY

## 2019-12-11 PROCEDURE — 85025 COMPLETE CBC W/AUTO DIFF WBC: CPT

## 2019-12-11 PROCEDURE — 6370000000 HC RX 637 (ALT 250 FOR IP): Performed by: OBSTETRICS & GYNECOLOGY

## 2019-12-11 PROCEDURE — 2580000003 HC RX 258: Performed by: OBSTETRICS & GYNECOLOGY

## 2019-12-11 PROCEDURE — 36415 COLL VENOUS BLD VENIPUNCTURE: CPT

## 2019-12-11 RX ADMIN — METFORMIN HYDROCHLORIDE 1 TABLET: 500 TABLET, EXTENDED RELEASE ORAL at 08:53

## 2019-12-11 RX ADMIN — IBUPROFEN 600 MG: 600 TABLET, FILM COATED ORAL at 02:25

## 2019-12-11 RX ADMIN — OXYCODONE HYDROCHLORIDE AND ACETAMINOPHEN 1 TABLET: 5; 325 TABLET ORAL at 03:15

## 2019-12-11 RX ADMIN — CEFAZOLIN SODIUM 1 G: 1 SOLUTION INTRAVENOUS at 07:49

## 2019-12-11 RX ADMIN — SIMETHICONE CHEW TAB 80 MG 80 MG: 80 TABLET ORAL at 17:35

## 2019-12-11 RX ADMIN — FAMOTIDINE 20 MG: 10 INJECTION, SOLUTION INTRAVENOUS at 08:53

## 2019-12-11 RX ADMIN — METRONIDAZOLE 500 MG: 500 TABLET, FILM COATED ORAL at 14:23

## 2019-12-11 RX ADMIN — OXYCODONE HYDROCHLORIDE AND ACETAMINOPHEN 1 TABLET: 5; 325 TABLET ORAL at 22:37

## 2019-12-11 RX ADMIN — IBUPROFEN 600 MG: 600 TABLET, FILM COATED ORAL at 20:35

## 2019-12-11 RX ADMIN — OXYCODONE HYDROCHLORIDE AND ACETAMINOPHEN 1 TABLET: 5; 325 TABLET ORAL at 17:34

## 2019-12-11 RX ADMIN — Medication 10 ML: at 07:49

## 2019-12-11 RX ADMIN — Medication 10 ML: at 03:22

## 2019-12-11 RX ADMIN — OXYCODONE HYDROCHLORIDE AND ACETAMINOPHEN 1 TABLET: 5; 325 TABLET ORAL at 07:54

## 2019-12-11 RX ADMIN — METRONIDAZOLE 500 MG: 500 TABLET, FILM COATED ORAL at 06:33

## 2019-12-11 RX ADMIN — FERROUS SULFATE TAB 325 MG (65 MG ELEMENTAL FE) 325 MG: 325 (65 FE) TAB at 08:59

## 2019-12-11 RX ADMIN — IBUPROFEN 600 MG: 600 TABLET, FILM COATED ORAL at 15:02

## 2019-12-11 RX ADMIN — OXYCODONE HYDROCHLORIDE AND ACETAMINOPHEN 1 TABLET: 5; 325 TABLET ORAL at 12:36

## 2019-12-11 RX ADMIN — Medication 10 ML: at 20:36

## 2019-12-11 RX ADMIN — DOCUSATE SODIUM 100 MG: 100 CAPSULE, LIQUID FILLED ORAL at 20:35

## 2019-12-11 RX ADMIN — SIMETHICONE CHEW TAB 80 MG 80 MG: 80 TABLET ORAL at 02:24

## 2019-12-11 RX ADMIN — FERROUS SULFATE TAB 325 MG (65 MG ELEMENTAL FE) 325 MG: 325 (65 FE) TAB at 17:35

## 2019-12-11 RX ADMIN — Medication 10 ML: at 08:53

## 2019-12-11 RX ADMIN — IBUPROFEN 600 MG: 600 TABLET, FILM COATED ORAL at 08:53

## 2019-12-11 RX ADMIN — SIMETHICONE CHEW TAB 80 MG 80 MG: 80 TABLET ORAL at 08:53

## 2019-12-11 RX ADMIN — DOCUSATE SODIUM 100 MG: 100 CAPSULE, LIQUID FILLED ORAL at 08:53

## 2019-12-11 RX ADMIN — METRONIDAZOLE 500 MG: 500 TABLET, FILM COATED ORAL at 22:37

## 2019-12-11 ASSESSMENT — PAIN SCALES - GENERAL
PAINLEVEL_OUTOF10: 3
PAINLEVEL_OUTOF10: 6
PAINLEVEL_OUTOF10: 5
PAINLEVEL_OUTOF10: 0
PAINLEVEL_OUTOF10: 5
PAINLEVEL_OUTOF10: 6
PAINLEVEL_OUTOF10: 7
PAINLEVEL_OUTOF10: 5
PAINLEVEL_OUTOF10: 5

## 2019-12-11 ASSESSMENT — PAIN DESCRIPTION - FREQUENCY
FREQUENCY: INTERMITTENT
FREQUENCY: INTERMITTENT

## 2019-12-11 ASSESSMENT — PAIN DESCRIPTION - PROGRESSION
CLINICAL_PROGRESSION: GRADUALLY WORSENING

## 2019-12-11 ASSESSMENT — PAIN DESCRIPTION - LOCATION
LOCATION: ABDOMEN
LOCATION: ABDOMEN

## 2019-12-11 ASSESSMENT — PAIN DESCRIPTION - ORIENTATION
ORIENTATION: LOWER
ORIENTATION: LOWER

## 2019-12-11 ASSESSMENT — PAIN DESCRIPTION - PAIN TYPE
TYPE: SURGICAL PAIN
TYPE: SURGICAL PAIN

## 2019-12-11 ASSESSMENT — PAIN DESCRIPTION - DESCRIPTORS
DESCRIPTORS: SORE
DESCRIPTORS: SORE

## 2019-12-12 PROCEDURE — 1220000000 HC SEMI PRIVATE OB R&B

## 2019-12-12 PROCEDURE — 59514 CESAREAN DELIVERY ONLY: CPT | Performed by: OBSTETRICS & GYNECOLOGY

## 2019-12-12 PROCEDURE — 2580000003 HC RX 258: Performed by: OBSTETRICS & GYNECOLOGY

## 2019-12-12 PROCEDURE — 6370000000 HC RX 637 (ALT 250 FOR IP): Performed by: OBSTETRICS & GYNECOLOGY

## 2019-12-12 RX ORDER — IBUPROFEN 600 MG/1
600 TABLET ORAL EVERY 6 HOURS PRN
Qty: 30 TABLET | Refills: 0 | Status: SHIPPED | OUTPATIENT
Start: 2019-12-12 | End: 2021-02-05

## 2019-12-12 RX ORDER — PSEUDOEPHEDRINE HCL 30 MG
100 TABLET ORAL 2 TIMES DAILY PRN
COMMUNITY
Start: 2019-12-12 | End: 2021-02-05

## 2019-12-12 RX ORDER — LANOLIN 100 %
OINTMENT (GRAM) TOPICAL
Refills: 0 | COMMUNITY
Start: 2019-12-12 | End: 2021-02-05

## 2019-12-12 RX ORDER — OXYCODONE HYDROCHLORIDE AND ACETAMINOPHEN 5; 325 MG/1; MG/1
1 TABLET ORAL EVERY 6 HOURS PRN
Qty: 20 TABLET | Refills: 0 | Status: SHIPPED | OUTPATIENT
Start: 2019-12-12 | End: 2019-12-17

## 2019-12-12 RX ORDER — SIMETHICONE 80 MG
80 TABLET,CHEWABLE ORAL EVERY 6 HOURS PRN
Refills: 0 | COMMUNITY
Start: 2019-12-12 | End: 2021-02-05

## 2019-12-12 RX ADMIN — OXYCODONE HYDROCHLORIDE AND ACETAMINOPHEN 1 TABLET: 5; 325 TABLET ORAL at 20:15

## 2019-12-12 RX ADMIN — DOCUSATE SODIUM 100 MG: 100 CAPSULE, LIQUID FILLED ORAL at 09:36

## 2019-12-12 RX ADMIN — METRONIDAZOLE 500 MG: 500 TABLET, FILM COATED ORAL at 14:21

## 2019-12-12 RX ADMIN — OXYCODONE HYDROCHLORIDE AND ACETAMINOPHEN 1 TABLET: 5; 325 TABLET ORAL at 11:08

## 2019-12-12 RX ADMIN — SIMETHICONE CHEW TAB 80 MG 80 MG: 80 TABLET ORAL at 20:20

## 2019-12-12 RX ADMIN — Medication 10 ML: at 09:37

## 2019-12-12 RX ADMIN — METRONIDAZOLE 500 MG: 500 TABLET, FILM COATED ORAL at 05:43

## 2019-12-12 RX ADMIN — IBUPROFEN 600 MG: 600 TABLET, FILM COATED ORAL at 22:21

## 2019-12-12 RX ADMIN — OXYCODONE HYDROCHLORIDE AND ACETAMINOPHEN 1 TABLET: 5; 325 TABLET ORAL at 15:12

## 2019-12-12 RX ADMIN — IBUPROFEN 600 MG: 600 TABLET, FILM COATED ORAL at 09:36

## 2019-12-12 RX ADMIN — IBUPROFEN 600 MG: 600 TABLET, FILM COATED ORAL at 16:20

## 2019-12-12 RX ADMIN — FERROUS SULFATE TAB 325 MG (65 MG ELEMENTAL FE) 325 MG: 325 (65 FE) TAB at 16:20

## 2019-12-12 RX ADMIN — SIMETHICONE CHEW TAB 80 MG 80 MG: 80 TABLET ORAL at 09:36

## 2019-12-12 RX ADMIN — FERROUS SULFATE TAB 325 MG (65 MG ELEMENTAL FE) 325 MG: 325 (65 FE) TAB at 09:36

## 2019-12-12 RX ADMIN — METFORMIN HYDROCHLORIDE 1 TABLET: 500 TABLET, EXTENDED RELEASE ORAL at 09:36

## 2019-12-12 RX ADMIN — DOCUSATE SODIUM 100 MG: 100 CAPSULE, LIQUID FILLED ORAL at 20:15

## 2019-12-12 RX ADMIN — OXYCODONE HYDROCHLORIDE AND ACETAMINOPHEN 1 TABLET: 5; 325 TABLET ORAL at 04:53

## 2019-12-12 RX ADMIN — SIMETHICONE CHEW TAB 80 MG 80 MG: 80 TABLET ORAL at 00:56

## 2019-12-12 ASSESSMENT — PAIN SCALES - GENERAL
PAINLEVEL_OUTOF10: 4
PAINLEVEL_OUTOF10: 5
PAINLEVEL_OUTOF10: 7
PAINLEVEL_OUTOF10: 5
PAINLEVEL_OUTOF10: 4

## 2019-12-12 ASSESSMENT — PAIN DESCRIPTION - PAIN TYPE: TYPE: SURGICAL PAIN

## 2019-12-12 ASSESSMENT — PAIN DESCRIPTION - DESCRIPTORS: DESCRIPTORS: SORE

## 2019-12-12 ASSESSMENT — PAIN DESCRIPTION - ORIENTATION: ORIENTATION: LOWER

## 2019-12-12 ASSESSMENT — PAIN DESCRIPTION - FREQUENCY: FREQUENCY: INTERMITTENT

## 2019-12-12 ASSESSMENT — PAIN DESCRIPTION - RADICULAR PAIN: RADICULAR_PAIN: ABSENT

## 2019-12-12 ASSESSMENT — PAIN DESCRIPTION - LOCATION: LOCATION: ABDOMEN

## 2019-12-12 ASSESSMENT — PAIN DESCRIPTION - PROGRESSION: CLINICAL_PROGRESSION: GRADUALLY WORSENING

## 2019-12-13 VITALS
OXYGEN SATURATION: 98 % | HEART RATE: 82 BPM | SYSTOLIC BLOOD PRESSURE: 127 MMHG | RESPIRATION RATE: 18 BRPM | HEIGHT: 68 IN | BODY MASS INDEX: 41.22 KG/M2 | WEIGHT: 272 LBS | TEMPERATURE: 98.8 F | DIASTOLIC BLOOD PRESSURE: 63 MMHG

## 2019-12-13 PROCEDURE — 6370000000 HC RX 637 (ALT 250 FOR IP): Performed by: OBSTETRICS & GYNECOLOGY

## 2019-12-13 RX ADMIN — Medication: at 09:46

## 2019-12-13 RX ADMIN — METFORMIN HYDROCHLORIDE 1 TABLET: 500 TABLET, EXTENDED RELEASE ORAL at 09:46

## 2019-12-13 RX ADMIN — IBUPROFEN 600 MG: 600 TABLET, FILM COATED ORAL at 09:46

## 2019-12-13 RX ADMIN — SIMETHICONE CHEW TAB 80 MG 80 MG: 80 TABLET ORAL at 09:46

## 2019-12-13 RX ADMIN — DOCUSATE SODIUM 100 MG: 100 CAPSULE, LIQUID FILLED ORAL at 09:46

## 2019-12-13 RX ADMIN — OXYCODONE HYDROCHLORIDE AND ACETAMINOPHEN 1 TABLET: 5; 325 TABLET ORAL at 05:22

## 2019-12-13 RX ADMIN — FERROUS SULFATE TAB 325 MG (65 MG ELEMENTAL FE) 325 MG: 325 (65 FE) TAB at 09:46

## 2019-12-13 ASSESSMENT — PAIN DESCRIPTION - DESCRIPTORS
DESCRIPTORS: TENDER
DESCRIPTORS: TENDER

## 2019-12-13 ASSESSMENT — PAIN SCALES - GENERAL
PAINLEVEL_OUTOF10: 1
PAINLEVEL_OUTOF10: 5
PAINLEVEL_OUTOF10: 4

## 2019-12-13 ASSESSMENT — PAIN - FUNCTIONAL ASSESSMENT
PAIN_FUNCTIONAL_ASSESSMENT: ACTIVITIES ARE NOT PREVENTED
PAIN_FUNCTIONAL_ASSESSMENT: ACTIVITIES ARE NOT PREVENTED

## 2019-12-13 ASSESSMENT — PAIN DESCRIPTION - FREQUENCY
FREQUENCY: INTERMITTENT
FREQUENCY: INTERMITTENT

## 2019-12-13 ASSESSMENT — PAIN DESCRIPTION - PAIN TYPE
TYPE: ACUTE PAIN;SURGICAL PAIN

## 2019-12-13 ASSESSMENT — PAIN DESCRIPTION - LOCATION
LOCATION: ABDOMEN;INCISION

## 2019-12-13 ASSESSMENT — PAIN DESCRIPTION - ORIENTATION
ORIENTATION: LOWER
ORIENTATION: LOWER

## 2019-12-13 ASSESSMENT — PAIN DESCRIPTION - PROGRESSION
CLINICAL_PROGRESSION: GRADUALLY IMPROVING
CLINICAL_PROGRESSION: NOT CHANGED

## 2020-12-18 ENCOUNTER — TELEPHONE (OUTPATIENT)
Dept: FAMILY MEDICINE CLINIC | Age: 35
End: 2020-12-18

## 2020-12-18 NOTE — TELEPHONE ENCOUNTER
Patient tested positive for COVID x 2 weeks ago-Quarantine was over 12/08/20 and was feeling better last week. Yesterday started with symptoms again like with the COVID cough, congestion, body aches-Is this Normal???  Not having a problems breathing or anything.

## 2020-12-18 NOTE — TELEPHONE ENCOUNTER
Spoke with Dr Aisha Baeza  And advised patient to seek treatment if she gets worse and trouble breathing. Patient verbalizes understanding.

## 2021-02-05 DIAGNOSIS — E28.2 PCOS (POLYCYSTIC OVARIAN SYNDROME): ICD-10-CM

## 2021-02-05 DIAGNOSIS — E34.9 HORMONE IMBALANCE: ICD-10-CM

## 2021-02-05 DIAGNOSIS — N94.6 DYSMENORRHEA: ICD-10-CM

## 2021-02-05 PROBLEM — O09.523 HIGH-RISK PREGNANCY, ELDERLY MULTIGRAVIDA, THIRD TRIMESTER: Status: RESOLVED | Noted: 2019-11-25 | Resolved: 2021-02-05

## 2021-02-05 PROBLEM — D50.9 IRON DEFICIENCY ANEMIA OF PREGNANCY: Status: RESOLVED | Noted: 2019-11-25 | Resolved: 2021-02-05

## 2021-02-05 PROBLEM — O99.019 IRON DEFICIENCY ANEMIA OF PREGNANCY: Status: RESOLVED | Noted: 2019-11-25 | Resolved: 2021-02-05

## 2021-02-05 PROBLEM — Z28.39 RUBELLA NON-IMMUNE STATUS, ANTEPARTUM: Status: RESOLVED | Noted: 2019-11-25 | Resolved: 2021-02-05

## 2021-02-05 PROBLEM — O99.213 OBESITY AFFECTING PREGNANCY IN THIRD TRIMESTER: Status: RESOLVED | Noted: 2019-11-25 | Resolved: 2021-02-05

## 2021-02-05 PROBLEM — O34.219 PREVIOUS CESAREAN, DELIVERED, CURRENT HOSPITALIZATION: Status: RESOLVED | Noted: 2019-12-10 | Resolved: 2021-02-05

## 2021-02-05 PROBLEM — Z3A.39 39 WEEKS GESTATION OF PREGNANCY: Status: RESOLVED | Noted: 2019-12-10 | Resolved: 2021-02-05

## 2021-02-05 PROBLEM — O09.899 RUBELLA NON-IMMUNE STATUS, ANTEPARTUM: Status: RESOLVED | Noted: 2019-11-25 | Resolved: 2021-02-05

## 2021-02-05 LAB
ESTRADIOL LEVEL: 60.8 PG/ML
FOLLICLE STIMULATING HORMONE: 5.3 MIU/ML
PROLACTIN: 13.91 NG/ML
T3 FREE: 3 PG/ML (ref 2–4.4)
T4 FREE: 0.97 NG/DL (ref 0.93–1.7)
TSH SERPL DL<=0.05 MIU/L-ACNC: 1.85 UIU/ML (ref 0.27–4.2)
VITAMIN D 25-HYDROXY: 11 NG/ML (ref 30–100)

## 2021-02-09 LAB
PROGESTERONE LEVEL: <0.05 NG/ML
SEX HORMONE BINDING GLOBULIN: 73 NMOL/L (ref 30–135)
TESTOSTERONE FREE-NONMALE: <1 PG/ML (ref 1.3–9.2)
TESTOSTERONE TOTAL: 6 NG/DL (ref 20–70)

## 2022-07-28 DIAGNOSIS — O09.523 HIGH-RISK PREGNANCY, ELDERLY MULTIGRAVIDA, THIRD TRIMESTER: ICD-10-CM

## 2022-07-28 LAB
HCT VFR BLD CALC: 33.9 % (ref 34–48)
HEMOGLOBIN: 10.8 G/DL (ref 11.5–15.5)
MCH RBC QN AUTO: 29.8 PG (ref 26–35)
MCHC RBC AUTO-ENTMCNC: 31.9 % (ref 32–34.5)
MCV RBC AUTO: 93.4 FL (ref 80–99.9)
PDW BLD-RTO: 15.7 FL (ref 11.5–15)
PLATELET # BLD: 136 E9/L (ref 130–450)
PMV BLD AUTO: 11.7 FL (ref 7–12)
RBC # BLD: 3.63 E12/L (ref 3.5–5.5)
WBC # BLD: 8.6 E9/L (ref 4.5–11.5)

## 2022-07-31 LAB — GROUP B STREP CULTURE: NORMAL

## 2022-08-01 LAB
C TRACH DNA GENITAL QL NAA+PROBE: NEGATIVE
N. GONORRHOEAE DNA: NEGATIVE
SOURCE: NORMAL

## 2022-08-06 ENCOUNTER — HOSPITAL ENCOUNTER (OUTPATIENT)
Age: 37
Setting detail: OBSERVATION
Discharge: HOSPICE | End: 2022-08-06
Attending: OBSTETRICS & GYNECOLOGY | Admitting: OBSTETRICS & GYNECOLOGY

## 2022-08-06 VITALS — SYSTOLIC BLOOD PRESSURE: 114 MMHG | DIASTOLIC BLOOD PRESSURE: 58 MMHG | HEART RATE: 76 BPM

## 2022-08-06 PROBLEM — O09.899 RUBELLA NON-IMMUNE STATUS, ANTEPARTUM: Status: ACTIVE | Noted: 2022-08-06

## 2022-08-06 PROBLEM — O34.219 PREVIOUS CESAREAN DELIVERY AFFECTING PREGNANCY: Status: RESOLVED | Noted: 2019-11-25 | Resolved: 2022-08-06

## 2022-08-06 PROBLEM — D62 POSTOPERATIVE ANEMIA DUE TO ACUTE BLOOD LOSS: Status: RESOLVED | Noted: 2019-12-11 | Resolved: 2022-08-06

## 2022-08-06 PROBLEM — Z86.32 HISTORY OF GESTATIONAL DIABETES IN PRIOR PREGNANCY, CURRENTLY PREGNANT IN THIRD TRIMESTER: Status: ACTIVE | Noted: 2022-08-06

## 2022-08-06 PROBLEM — O09.293 HISTORY OF GESTATIONAL DIABETES IN PRIOR PREGNANCY, CURRENTLY PREGNANT IN THIRD TRIMESTER: Status: ACTIVE | Noted: 2022-08-06

## 2022-08-06 PROBLEM — O24.410 DIET CONTROLLED GESTATIONAL DIABETES MELLITUS (GDM) IN THIRD TRIMESTER: Status: RESOLVED | Noted: 2019-11-25 | Resolved: 2022-08-06

## 2022-08-06 PROBLEM — Z3A.35 35 WEEKS GESTATION OF PREGNANCY: Status: ACTIVE | Noted: 2022-08-06

## 2022-08-06 PROBLEM — R10.9 ABDOMINAL PAIN DURING PREGNANCY IN THIRD TRIMESTER: Status: ACTIVE | Noted: 2022-08-06

## 2022-08-06 PROBLEM — E55.9 VITAMIN D DEFICIENCY: Status: ACTIVE | Noted: 2022-08-06

## 2022-08-06 PROBLEM — Z28.39 RUBELLA NON-IMMUNE STATUS, ANTEPARTUM: Status: ACTIVE | Noted: 2022-08-06

## 2022-08-06 PROBLEM — O26.893 ABDOMINAL PAIN DURING PREGNANCY IN THIRD TRIMESTER: Status: ACTIVE | Noted: 2022-08-06

## 2022-08-06 PROBLEM — Z98.891 PREVIOUS CESAREAN SECTION: Status: ACTIVE | Noted: 2022-08-06

## 2022-08-06 LAB
ALBUMIN SERPL-MCNC: 3.2 G/DL (ref 3.5–5.2)
ALP BLD-CCNC: 116 U/L (ref 35–104)
ALT SERPL-CCNC: 14 U/L (ref 0–32)
ANION GAP SERPL CALCULATED.3IONS-SCNC: 10 MMOL/L (ref 7–16)
AST SERPL-CCNC: 26 U/L (ref 0–31)
BILIRUB SERPL-MCNC: 0.3 MG/DL (ref 0–1.2)
BILIRUBIN URINE: NEGATIVE
BLOOD, URINE: NEGATIVE
BUN BLDV-MCNC: 5 MG/DL (ref 6–20)
CALCIUM SERPL-MCNC: 10.4 MG/DL (ref 8.6–10.2)
CHLORIDE BLD-SCNC: 103 MMOL/L (ref 98–107)
CLARITY: CLEAR
CO2: 19 MMOL/L (ref 22–29)
COLOR: NORMAL
CREAT SERPL-MCNC: 0.5 MG/DL (ref 0.5–1)
CREATININE URINE: 44 MG/DL (ref 29–226)
GFR AFRICAN AMERICAN: >60
GFR NON-AFRICAN AMERICAN: >60 ML/MIN/1.73
GLUCOSE BLD-MCNC: 96 MG/DL (ref 74–99)
GLUCOSE URINE: NEGATIVE MG/DL
HCT VFR BLD CALC: 32.7 % (ref 34–48)
HEMOGLOBIN: 10.9 G/DL (ref 11.5–15.5)
KETONES, URINE: NEGATIVE MG/DL
LEUKOCYTE ESTERASE, URINE: NEGATIVE
MCH RBC QN AUTO: 30.4 PG (ref 26–35)
MCHC RBC AUTO-ENTMCNC: 33.3 % (ref 32–34.5)
MCV RBC AUTO: 91.1 FL (ref 80–99.9)
NITRITE, URINE: NEGATIVE
PDW BLD-RTO: 15.5 FL (ref 11.5–15)
PH UA: 7 (ref 5–9)
PLATELET # BLD: 125 E9/L (ref 130–450)
PMV BLD AUTO: 10.8 FL (ref 7–12)
POTASSIUM SERPL-SCNC: 3.8 MMOL/L (ref 3.5–5)
PROTEIN PROTEIN: 5 MG/DL (ref 0–12)
PROTEIN UA: NEGATIVE MG/DL
PROTEIN/CREAT RATIO: 0.1
PROTEIN/CREAT RATIO: 0.1 (ref 0–0.2)
RBC # BLD: 3.59 E12/L (ref 3.5–5.5)
SODIUM BLD-SCNC: 132 MMOL/L (ref 132–146)
SPECIFIC GRAVITY UA: 1.01 (ref 1–1.03)
TOTAL PROTEIN: 6 G/DL (ref 6.4–8.3)
UROBILINOGEN, URINE: 0.2 E.U./DL
WBC # BLD: 11.2 E9/L (ref 4.5–11.5)

## 2022-08-06 PROCEDURE — 80053 COMPREHEN METABOLIC PANEL: CPT

## 2022-08-06 PROCEDURE — 85027 COMPLETE CBC AUTOMATED: CPT

## 2022-08-06 PROCEDURE — G0378 HOSPITAL OBSERVATION PER HR: HCPCS

## 2022-08-06 PROCEDURE — 36415 COLL VENOUS BLD VENIPUNCTURE: CPT

## 2022-08-06 PROCEDURE — 81003 URINALYSIS AUTO W/O SCOPE: CPT

## 2022-08-06 PROCEDURE — 84156 ASSAY OF PROTEIN URINE: CPT

## 2022-08-06 PROCEDURE — 82570 ASSAY OF URINE CREATININE: CPT

## 2022-08-06 NOTE — PROGRESS NOTES
L&D DISCHARGE NOTE:    Patient:  Nathan Leonardo     Admit Date:  2022 12:03 AM  Medical Record Number:  [id number]   Today's Date: 2022    S: 78-year-old W female  4 para 2 AB 1 with a history of 2 prior  sections presented late in the evening with new onset midepigastric discomfort starting approximately 1900 this evening. She had peaches in oatmeal for dinner. She tried some Tums, but did not work as result she contacted the answering service. The on-call physician advised her to present to labor and delivery for further evaluation. The pain initially was constant but the intensity would worsen in relieved slightly. Was 8 out of 10 on the pain scale. Patient waited in the waiting room for some time prior to bring bedded in the recovery room. Once she got into the bed, the pain greatly improved. She has had no nausea or vomiting. She has had some slight swelling of the lower extremities. She denies visual changes. She does not feel that she is having uterine contractions. She has had no leaking fluid or vaginal bleeding. She has no urinary complaints. At the time of this evaluation her symptoms have resolved. She would like to go home. She has some concerns regarding her initial blood pressure of 146/76. She has had intermittent blood pressure elevations in the office. O:   Vitals:    22 0247 22 0253 22 0323 22 0354   BP: 119/60 124/63 (!) 118/58 (!) 114/58   Pulse:  72 75 76     BP(i) 146/76     NST/prolonged monitoring: reactive  Baseline FHR: 120 BPM  Baseline FHR Variability: moderate in degree  Accelerations: present  Periodic or Episodic Decelerations: Absent  Changes or Trends of FHR pattern over time: No significant baseline changes  Frequency and intensity of uterine contractions: absent    Gen: Resting comfortably alert and oriented x3 no acute distress cooperative and pleasant she appears very stated age and is well groomed.   HEENT: Normocephalic, nontender, no adenopathy, EOMI, PERRLA  Heart: Regular rate and rhythm  Lungs: Clear to auscultation bilaterally  Back: Negative for CVA or paraspinal tenderness  Abdomen: Gravid, soft, nontender, nondistended sounds present. Uterus: Gravid, nontender  Cervix: not reexamined  Extremity: Trace lower extremity edema  Skin: Warm, dry, no lesions  Neurologic: 2+ DTR, no clonus     Latest Reference Range & Units 8/6/22 02:20   Sodium 132 - 146 mmol/L 132   Potassium 3.5 - 5.0 mmol/L 3.8   Chloride 98 - 107 mmol/L 103   CO2 22 - 29 mmol/L 19 (L)   BUN,BUNPL 6 - 20 mg/dL 5 (L)   Creatinine 0.5 - 1.0 mg/dL 0.5   Anion Gap 7 - 16 mmol/L 10   GFR Non-African American >=60 mL/min/1.73 >60   GFR African American  >60   Protein/Creat Ratio -   0.0 - 0.2  0.1  0.1   GLUCOSE, FASTING,GF 74 - 99 mg/dL 96   CALCIUM, SERUM, 363662 8.6 - 10.2 mg/dL 10.4 (H)   Total Protein 6.4 - 8.3 g/dL 6.0 (L)   Albumin 3.5 - 5.2 g/dL 3.2 (L)   Alk Phos 35 - 104 U/L 116 (H)   ALT 0 - 32 U/L 14   AST 0 - 31 U/L 26   Bilirubin 0.0 - 1.2 mg/dL 0.3   WBC 4.5 - 11.5 E9/L 11.2   RBC 3.50 - 5.50 E12/L 3.59   Hemoglobin Quant 11.5 - 15.5 g/dL 10.9 (L)   Hematocrit 34.0 - 48.0 % 32.7 (L)   MCV 80.0 - 99.9 fL 91.1   MCH 26.0 - 35.0 pg 30.4   MCHC 32.0 - 34.5 % 33.3   MPV 7.0 - 12.0 fL 10.8   RDW 11.5 - 15.0 fL 15.5 (H)   Platelet Count 892 - 450 E9/L 125 (L)   (L): Data is abnormally low  (H): Data is abnormally high    A:37 y.o.  W female at 27w7d B4V1204 for NST due to decreased fetal movement  Midepigastric pain in pregnancy shortly after eating -resolved spontaneously  BP initial elevation -normotensive since, normal PIH labs  Thrombocytopenia -idiopathic (were decreased [105], in 2019 pregnancy as well)  Decreased fetal movement -reactive NST  Patient Active Problem List   Diagnosis    Obesity in pregnancy, antepartum    PCOS (polycystic ovarian syndrome)    High risk pregnancy, multigravida of advanced maternal age in third trimester Declines  (vaginal birth after ) trial    H/O spontaneous  (2014), currently pregnant    Iron deficiency anemia of pregnancy -on FeSO4 325 twice daily    35 5/7 weeks gestation of pregnancy    Abdominal pain during pregnancy in third trimester    Previous  section x2    History of gestational diabetes in prior pregnancy, currently pregnant in third trimester    Adult BMI 40.0-44.9 kg/sq m (pregravid BMI 40)    Rubella non-immune status, antepartum (EQUIVOCAL)    Vitamin D deficiency on 2000IU daily     NST results: reactive    P: Dr. Stephany Liu MD notified of fetal status. Discharge home with instructions and precautions in a stable disposition. Fetal movement counts daily. Call immediately if decreased. Reassurance given regarding today's normal findings. PIH precautions reviewed, call with severe or persistent headache, visual changes, rapid weight gain/swelling, midepigastric/right upper quadrant pain. .  PTL precautions reviewed, call with regular or persistent contractions, leaking fluid, vaginal bleeding. Repeat platelet count in 1 to 2 weeks at a prenatal visit. Keep next appointment with Dr. Stephany Liu MD.    Stephany Liu MD, M.D.  FACOG  2022 4:25 AM

## 2022-08-06 NOTE — DISCHARGE INSTRUCTIONS
Home Undelivered Discharge Instructions    After Discharge Orders:    Future Appointments   Date Time Provider Joe Monik   8/12/2022 11:00 AM SCHEDULE, VIRGILIO SOLIMANS  AFLKOULIANOS VIRGILIO Asmita   8/17/2022 11:00 AM SCHEDULE, AFL ASMITA P KOULIANOS US AFLKOULIANOS AFL Yon Gallegos   8/17/2022 11:40 AM MD KATY House Yonmarlo Dianedemetrius   8/26/2022 10:30 AM SCHEDULE, VIRGILIO ASMITA P LISAULIANOS  AFLKOULIANOS AFL [employer] physician or midwife's office on *** for instructions. Diet:  {diet:19328}    Rest: {rest:19329}    Other instructions: Do kick counts once a day on your baby. Choose the time of day your baby is most active. Get in a comfortable lying or sitting position and time how long it takes to feel 10 kicks, twists, turns, swishes, or rolls.  Call your physician or midwife if there have not been 10 kicks in {kick count number:72475} hours    Call physician or midwife, return to Labor and Delivery, call 911, or go to the nearest Emergency Room if: increased leakage or fluid, contractions more than  6 per  1 hour, decreased fetal movement, persistent low back pain or cramping, bleeding from vaginal area, difficulty urinating, pain with urination, difficulty breathing, new calf pain, persistent headache, vision change, or ***

## 2022-08-06 NOTE — PROGRESS NOTES
Patient given verbal and written d/c instructions, and [facility] warning signs. Ambulatory to exit.

## 2022-08-06 NOTE — PROGRESS NOTES
35w5d presents to unit with c\o abdominal pain that started yesterday. Pt is a  x2. Pt says the pain is constant but the intensity worsens and relives slightly. Pt denies LOF, VB. + FM. Pt placed on EFM.  Call light within reach

## 2022-08-06 NOTE — H&P
CHIEF COMPLAINT:    Pressure feeling anterior abdominal wall, mostly at the level of the fundus, was rated at 8/10, now much improved. Feels some decrease in fetal movements. No other complaints. No sharp pain. No nausea/vomiting. HISTORY OF PRESENT ILLNESS:    The patient is a 42 y.o. female [id number], Patient's last menstrual period was 2021.,  at 35w5d. OB History          4    Para   2    Term   2            AB   1    Living   2         SAB   1    IAB        Ectopic        Molar        Multiple   0    Live Births   2            Patient presents with a chief complaint as above and is being admitted for evaluation    Estimated Due Date: Estimated Date of Delivery: 22    PRENATAL CARE:  Complicated by:   Patient Active Problem List   Diagnosis Code    PCOS (polycystic ovarian syndrome) E28.2    Previous  delivery x1 affecting pregnancy- scheduled 12/10/19 at 1430, (to follow noon C/S) O34.219    Declines  (vaginal birth after ) trial O31.200    Diet controlled gestational diabetes mellitus (GDM) in third trimester O20.18    H/O spontaneous  (), currently pregnant O09.299    Postoperative anemia due to acute blood loss D62       PAST OB HISTORY  OB History          4    Para   2    Term   2            AB   1    Living   2         SAB   1    IAB        Ectopic        Molar        Multiple   0    Live Births   2                Past Medical History:        Diagnosis Date    Anemia     Diet controlled gestational diabetes mellitus (GDM) in third trimester 2019    DUB (dysfunctional uterine bleeding) 2013    Endocervical polyp 2015    Irregular menses 2015       Past Surgical History:        Procedure Laterality Date     SECTION       SECTION N/A 12/10/2019     SECTION performed by Renetta Paul MD at [facility] L&D OR       Social History:    TOBACCO:   reports that she has quit smoking. She has never used smokeless tobacco.  ETOH:   reports no history of alcohol use. DRUGS:   reports no history of drug use. Family History:       Problem Relation Age of Onset    Diabetes Mother     Diabetes Father     Heart Disease Father     Other Father        Medications Prior to Admission:  Medications Prior to Admission: ferrous sulfate (IRON 325) 325 (65 Fe) MG tablet, Take 1 tablet by mouth 3 times daily (with meals)  nystatin (MYCOSTATIN) 945546 UNIT/GM powder, Apply topically 4 times daily. aspirin 81 MG chewable tablet, Take 81 mg by mouth daily  docusate sodium (COLACE) 100 MG capsule, Take 100 mg by mouth 2 times daily  vitamin D3 (CHOLECALCIFEROL) 25 MCG (1000 UT) TABS tablet, Take 2 tablets by mouth daily  Probiotic Product (PROBIOTIC-10 PO), Take by mouth  Multiple Vitamin (MULTIVITAMIN ADULT PO), Take by mouth    Allergies: Other, Ketorolac, Ondansetron, Toradol [ketorolac tromethamine], and Zofran [ondansetron hcl]    Review of Systems:   Constitutional : No fever, no chills   HEENT: No headache, no visual changes, no rhinorrhea, no sore throat   Cardiovascular : No pain, no palpitations, no edema   Respiratory : No pain, no shortness of breath   Gastrointestinal : No N/V, no D/C, no abdominal pain   Genitourinary : No dysuria, hematuria and no incontinence   Musculoskeletal : No myalgia, No back pain  Neurological : No numbness, no tingling, no tremors. No history of seizures  All other systems were reported as negative. PHYSICAL EXAM:    General appearance:  awake, alert, cooperative, no apparent distress, and appears stated age  Neurologic:  Awake, alert, oriented to name, place and time. Lungs:  No increased work of breathing, good air exchange, clear to auscultation bilaterally, no crackles or wheezing  Heart:  Normal apical impulse, regular rate and rhythm, normal S1 and S2, no S3 or S4, and no murmur noted  Abdomen:  Her uterus is gravid. No tenderness elicited on palpation.  The fetus is in the cephalic presentation. No contractions palpated. Fetal heart rate:  Fetal heart tracing is reassuring with a normal baseline, variability, and accelerations. No decelerations detected. Pelvis:  External Genitalia: General appearance; normal, Hair distribution; normal, Lesions absent  Cervix: Fetal head at -4, cervix closed and thick. Extremities: Mild peripheral edema is noted. LMP 2021     General Labs:    CBC:   Lab Results   Component Value Date/Time    WBC 8.6 2022 01:33 PM    RBC 3.63 2022 01:33 PM    HGB 10.8 2022 01:33 PM    HCT 33.9 2022 01:33 PM    MCV 93.4 2022 01:33 PM    RDW 15.7 2022 01:33 PM     2022 01:33 PM     CMP:    Lab Results   Component Value Date/Time     2013 02:08 PM    K 4.6 2013 02:08 PM     2013 02:08 PM    CO2 25 2013 02:08 PM    BUN 7 2013 02:08 PM     U/A:  No components found for: [place], USPGRAV, UPH, UPROTEIN, UGLUCOSE, UKETONE, UBILI, UBLOOD, UNITRITE, UUROBIL, ULEUKEST, USQEPI, URENEPI, UWBC, URBC, UBACTERIA, UHYALINE    ASSESSMENT AND PLAN:  Abdominal pressure feeling, likely painful B-H contractions, now improved. No suspicion of  labor. No contractions on tocography. Fetal heart tracing reassuring.

## 2022-08-12 DIAGNOSIS — D69.6 TEMPORARY LOW PLATELET COUNT (HCC): ICD-10-CM

## 2022-08-12 LAB
HCT VFR BLD CALC: 34.1 % (ref 34–48)
HEMOGLOBIN: 11.1 G/DL (ref 11.5–15.5)
MCH RBC QN AUTO: 30.2 PG (ref 26–35)
MCHC RBC AUTO-ENTMCNC: 32.6 % (ref 32–34.5)
MCV RBC AUTO: 92.9 FL (ref 80–99.9)
PDW BLD-RTO: 15.1 FL (ref 11.5–15)
PLATELET # BLD: 139 E9/L (ref 130–450)
PMV BLD AUTO: 11.7 FL (ref 7–12)
RBC # BLD: 3.67 E12/L (ref 3.5–5.5)
WBC # BLD: 8.8 E9/L (ref 4.5–11.5)

## 2022-08-17 PROBLEM — O26.893 ABDOMINAL PAIN DURING PREGNANCY IN THIRD TRIMESTER: Status: RESOLVED | Noted: 2022-08-06 | Resolved: 2022-08-17

## 2022-08-17 PROBLEM — R10.9 ABDOMINAL PAIN DURING PREGNANCY IN THIRD TRIMESTER: Status: RESOLVED | Noted: 2022-08-06 | Resolved: 2022-08-17

## 2022-08-26 ENCOUNTER — ANESTHESIA EVENT (OUTPATIENT)
Dept: LABOR AND DELIVERY | Age: 37
End: 2022-08-26

## 2022-08-26 PROBLEM — O34.219 PREVIOUS CESAREAN SECTION COMPLICATING PREGNANCY: Status: ACTIVE | Noted: 2022-08-06

## 2022-08-26 PROBLEM — O09.293 H/O SPONTANEOUS ABORTION, CURRENTLY PREGNANT, THIRD TRIMESTER: Status: ACTIVE | Noted: 2019-11-25

## 2022-08-29 ENCOUNTER — HOSPITAL ENCOUNTER (INPATIENT)
Age: 37
LOS: 3 days | Discharge: HOME | End: 2022-09-01
Attending: OBSTETRICS & GYNECOLOGY | Admitting: OBSTETRICS & GYNECOLOGY

## 2022-08-29 ENCOUNTER — ANESTHESIA (OUTPATIENT)
Dept: LABOR AND DELIVERY | Age: 37
End: 2022-08-29

## 2022-08-29 DIAGNOSIS — G89.18 POSTOPERATIVE PAIN: Primary | ICD-10-CM

## 2022-08-29 PROBLEM — Z3A.35 35 WEEKS GESTATION OF PREGNANCY: Status: RESOLVED | Noted: 2022-08-06 | Resolved: 2022-08-29

## 2022-08-29 PROBLEM — Z3A.39 39 WEEKS GESTATION OF PREGNANCY: Status: ACTIVE | Noted: 2022-08-29

## 2022-08-29 PROBLEM — Z34.93 NORMAL PREGNANCY, THIRD TRIMESTER: Status: ACTIVE | Noted: 2022-08-29

## 2022-08-29 PROBLEM — E55.9 VITAMIN D DEFICIENCY: Status: RESOLVED | Noted: 2022-08-06 | Resolved: 2022-08-29

## 2022-08-29 LAB
ABO/RH: NORMAL
AMPHETAMINE SCREEN, URINE: NOT DETECTED
ANTIBODY SCREEN: NORMAL
BARBITURATE SCREEN URINE: NOT DETECTED
BENZODIAZEPINE SCREEN, URINE: NOT DETECTED
CANNABINOID SCREEN URINE: NOT DETECTED
COCAINE METABOLITE SCREEN URINE: NOT DETECTED
FENTANYL SCREEN, URINE: NOT DETECTED
HCT VFR BLD CALC: 33.1 % (ref 34–48)
HEMOGLOBIN: 11.3 G/DL (ref 11.5–15.5)
Lab: NORMAL
MCH RBC QN AUTO: 31.2 PG (ref 26–35)
MCHC RBC AUTO-ENTMCNC: 34.1 % (ref 32–34.5)
MCV RBC AUTO: 91.4 FL (ref 80–99.9)
METHADONE SCREEN, URINE: NOT DETECTED
OPIATE SCREEN URINE: NOT DETECTED
OXYCODONE URINE: NOT DETECTED
PDW BLD-RTO: 15.1 FL (ref 11.5–15)
PHENCYCLIDINE SCREEN URINE: NOT DETECTED
PLATELET # BLD: 131 E9/L (ref 130–450)
PMV BLD AUTO: 11.5 FL (ref 7–12)
RBC # BLD: 3.62 E12/L (ref 3.5–5.5)
WBC # BLD: 9.2 E9/L (ref 4.5–11.5)

## 2022-08-29 PROCEDURE — 6360000002 HC RX W HCPCS

## 2022-08-29 PROCEDURE — 2500000003 HC RX 250 WO HCPCS: Performed by: ANESTHESIOLOGY

## 2022-08-29 PROCEDURE — 2709999900 HC NON-CHARGEABLE SUPPLY: Performed by: OBSTETRICS & GYNECOLOGY

## 2022-08-29 PROCEDURE — 85027 COMPLETE CBC AUTOMATED: CPT

## 2022-08-29 PROCEDURE — 1220000000 HC SEMI PRIVATE OB R&B

## 2022-08-29 PROCEDURE — 80307 DRUG TEST PRSMV CHEM ANLYZR: CPT

## 2022-08-29 PROCEDURE — 3700000000 HC ANESTHESIA ATTENDED CARE: Performed by: OBSTETRICS & GYNECOLOGY

## 2022-08-29 PROCEDURE — 6360000002 HC RX W HCPCS: Performed by: OBSTETRICS & GYNECOLOGY

## 2022-08-29 PROCEDURE — 86901 BLOOD TYPING SEROLOGIC RH(D): CPT

## 2022-08-29 PROCEDURE — 6370000000 HC RX 637 (ALT 250 FOR IP): Performed by: OBSTETRICS & GYNECOLOGY

## 2022-08-29 PROCEDURE — 3609079900 HC CESAREAN SECTION: Performed by: OBSTETRICS & GYNECOLOGY

## 2022-08-29 PROCEDURE — 36415 COLL VENOUS BLD VENIPUNCTURE: CPT

## 2022-08-29 PROCEDURE — 86850 RBC ANTIBODY SCREEN: CPT

## 2022-08-29 PROCEDURE — 2580000003 HC RX 258: Performed by: OBSTETRICS & GYNECOLOGY

## 2022-08-29 PROCEDURE — 7100000000 HC PACU RECOVERY - FIRST 15 MIN: Performed by: OBSTETRICS & GYNECOLOGY

## 2022-08-29 PROCEDURE — 6370000000 HC RX 637 (ALT 250 FOR IP)

## 2022-08-29 PROCEDURE — 7100000001 HC PACU RECOVERY - ADDTL 15 MIN: Performed by: OBSTETRICS & GYNECOLOGY

## 2022-08-29 PROCEDURE — 59514 CESAREAN DELIVERY ONLY: CPT | Performed by: OBSTETRICS & GYNECOLOGY

## 2022-08-29 PROCEDURE — 3700000001 HC ADD 15 MINUTES (ANESTHESIA): Performed by: OBSTETRICS & GYNECOLOGY

## 2022-08-29 PROCEDURE — 2580000003 HC RX 258

## 2022-08-29 PROCEDURE — 86900 BLOOD TYPING SEROLOGIC ABO: CPT

## 2022-08-29 RX ORDER — SODIUM CHLORIDE, SODIUM LACTATE, POTASSIUM CHLORIDE, CALCIUM CHLORIDE 600; 310; 30; 20 MG/100ML; MG/100ML; MG/100ML; MG/100ML
INJECTION, SOLUTION INTRAVENOUS CONTINUOUS PRN
Status: DISCONTINUED | OUTPATIENT
Start: 2022-08-29 | End: 2022-08-29 | Stop reason: SDUPTHER

## 2022-08-29 RX ORDER — BUPIVACAINE HYDROCHLORIDE 7.5 MG/ML
INJECTION, SOLUTION INTRASPINAL
Status: COMPLETED | OUTPATIENT
Start: 2022-08-29 | End: 2022-08-29

## 2022-08-29 RX ORDER — SODIUM CHLORIDE 0.9 % (FLUSH) 0.9 %
10 SYRINGE (ML) INJECTION PRN
Status: DISCONTINUED | OUTPATIENT
Start: 2022-08-29 | End: 2022-08-29

## 2022-08-29 RX ORDER — SODIUM CHLORIDE 0.9 % (FLUSH) 0.9 %
5-40 SYRINGE (ML) INJECTION PRN
Status: DISCONTINUED | OUTPATIENT
Start: 2022-08-29 | End: 2022-09-01 | Stop reason: HOSPADM

## 2022-08-29 RX ORDER — DOCUSATE SODIUM 100 MG/1
100 CAPSULE, LIQUID FILLED ORAL 2 TIMES DAILY
Status: DISCONTINUED | OUTPATIENT
Start: 2022-08-29 | End: 2022-08-29 | Stop reason: SDUPTHER

## 2022-08-29 RX ORDER — MODIFIED LANOLIN
OINTMENT (GRAM) TOPICAL
Status: DISCONTINUED | OUTPATIENT
Start: 2022-08-29 | End: 2022-09-01 | Stop reason: HOSPADM

## 2022-08-29 RX ORDER — SODIUM CHLORIDE 9 MG/ML
INJECTION, SOLUTION INTRAVENOUS PRN
Status: DISCONTINUED | OUTPATIENT
Start: 2022-08-29 | End: 2022-09-01 | Stop reason: HOSPADM

## 2022-08-29 RX ORDER — BISACODYL 10 MG
10 SUPPOSITORY, RECTAL RECTAL DAILY PRN
Status: DISCONTINUED | OUTPATIENT
Start: 2022-08-31 | End: 2022-09-01 | Stop reason: HOSPADM

## 2022-08-29 RX ORDER — SODIUM CHLORIDE 0.9 % (FLUSH) 0.9 %
5-40 SYRINGE (ML) INJECTION EVERY 12 HOURS SCHEDULED
Status: DISCONTINUED | OUTPATIENT
Start: 2022-08-29 | End: 2022-09-01 | Stop reason: HOSPADM

## 2022-08-29 RX ORDER — SODIUM CHLORIDE, SODIUM LACTATE, POTASSIUM CHLORIDE, AND CALCIUM CHLORIDE .6; .31; .03; .02 G/100ML; G/100ML; G/100ML; G/100ML
1000 INJECTION, SOLUTION INTRAVENOUS ONCE
Status: DISCONTINUED | OUTPATIENT
Start: 2022-08-29 | End: 2022-08-29

## 2022-08-29 RX ORDER — OXYCODONE HYDROCHLORIDE 5 MG/1
5 TABLET ORAL EVERY 4 HOURS PRN
Status: DISCONTINUED | OUTPATIENT
Start: 2022-08-30 | End: 2022-09-01 | Stop reason: HOSPADM

## 2022-08-29 RX ORDER — SODIUM CHLORIDE, SODIUM LACTATE, POTASSIUM CHLORIDE, CALCIUM CHLORIDE 600; 310; 30; 20 MG/100ML; MG/100ML; MG/100ML; MG/100ML
INJECTION, SOLUTION INTRAVENOUS CONTINUOUS
Status: DISCONTINUED | OUTPATIENT
Start: 2022-08-29 | End: 2022-09-01 | Stop reason: HOSPADM

## 2022-08-29 RX ORDER — ACETAMINOPHEN 500 MG
1000 TABLET ORAL EVERY 8 HOURS
Status: DISCONTINUED | OUTPATIENT
Start: 2022-08-29 | End: 2022-09-01 | Stop reason: HOSPADM

## 2022-08-29 RX ORDER — SIMETHICONE 80 MG
80 TABLET,CHEWABLE ORAL EVERY 6 HOURS PRN
Status: DISCONTINUED | OUTPATIENT
Start: 2022-08-29 | End: 2022-09-01 | Stop reason: HOSPADM

## 2022-08-29 RX ORDER — SODIUM CHLORIDE, SODIUM LACTATE, POTASSIUM CHLORIDE, CALCIUM CHLORIDE 600; 310; 30; 20 MG/100ML; MG/100ML; MG/100ML; MG/100ML
INJECTION, SOLUTION INTRAVENOUS CONTINUOUS
Status: DISCONTINUED | OUTPATIENT
Start: 2022-08-29 | End: 2022-08-29

## 2022-08-29 RX ORDER — BUPIVACAINE HYDROCHLORIDE 7.5 MG/ML
INJECTION, SOLUTION INTRASPINAL PRN
Status: DISCONTINUED | OUTPATIENT
Start: 2022-08-29 | End: 2022-08-29

## 2022-08-29 RX ORDER — OXYCODONE HYDROCHLORIDE 5 MG/1
10 TABLET ORAL EVERY 4 HOURS PRN
Status: DISCONTINUED | OUTPATIENT
Start: 2022-08-30 | End: 2022-09-01 | Stop reason: HOSPADM

## 2022-08-29 RX ORDER — DIPHENHYDRAMINE HYDROCHLORIDE 50 MG/ML
25 INJECTION INTRAMUSCULAR; INTRAVENOUS EVERY 6 HOURS PRN
Status: ACTIVE | OUTPATIENT
Start: 2022-08-29 | End: 2022-08-30

## 2022-08-29 RX ORDER — SODIUM CHLORIDE 0.9 % (FLUSH) 0.9 %
10 SYRINGE (ML) INJECTION EVERY 12 HOURS SCHEDULED
Status: DISCONTINUED | OUTPATIENT
Start: 2022-08-29 | End: 2022-08-29

## 2022-08-29 RX ORDER — IBUPROFEN 600 MG/1
600 TABLET ORAL EVERY 6 HOURS PRN
Status: DISCONTINUED | OUTPATIENT
Start: 2022-08-29 | End: 2022-09-01 | Stop reason: HOSPADM

## 2022-08-29 RX ORDER — TRISODIUM CITRATE DIHYDRATE AND CITRIC ACID MONOHYDRATE 500; 334 MG/5ML; MG/5ML
30 SOLUTION ORAL ONCE
Status: COMPLETED | OUTPATIENT
Start: 2022-08-29 | End: 2022-08-29

## 2022-08-29 RX ORDER — LACTOBACILLUS RHAMNOSUS GG 10B CELL
1 CAPSULE ORAL
Status: DISCONTINUED | OUTPATIENT
Start: 2022-08-29 | End: 2022-09-01 | Stop reason: HOSPADM

## 2022-08-29 RX ORDER — NALOXONE HYDROCHLORIDE 0.4 MG/ML
INJECTION, SOLUTION INTRAMUSCULAR; INTRAVENOUS; SUBCUTANEOUS PRN
Status: ACTIVE | OUTPATIENT
Start: 2022-08-29 | End: 2022-08-30

## 2022-08-29 RX ORDER — FERROUS SULFATE 325(65) MG
325 TABLET ORAL 2 TIMES DAILY WITH MEALS
Status: DISCONTINUED | OUTPATIENT
Start: 2022-08-29 | End: 2022-09-01 | Stop reason: HOSPADM

## 2022-08-29 RX ORDER — DIPHENHYDRAMINE HYDROCHLORIDE 50 MG/ML
INJECTION INTRAMUSCULAR; INTRAVENOUS PRN
Status: DISCONTINUED | OUTPATIENT
Start: 2022-08-29 | End: 2022-08-29 | Stop reason: SDUPTHER

## 2022-08-29 RX ORDER — PRENATAL WITH FERROUS FUM AND FOLIC ACID 3080; 920; 120; 400; 22; 1.84; 3; 20; 10; 1; 12; 200; 27; 25; 2 [IU]/1; [IU]/1; MG/1; [IU]/1; MG/1; MG/1; MG/1; MG/1; MG/1; MG/1; UG/1; MG/1; MG/1; MG/1; MG/1
1 TABLET ORAL
Status: DISCONTINUED | OUTPATIENT
Start: 2022-08-29 | End: 2022-09-01 | Stop reason: HOSPADM

## 2022-08-29 RX ORDER — DIPHENHYDRAMINE HCL 25 MG
25 TABLET ORAL EVERY 6 HOURS PRN
Status: ACTIVE | OUTPATIENT
Start: 2022-08-29 | End: 2022-08-30

## 2022-08-29 RX ORDER — MORPHINE SULFATE 10 MG/ML
INJECTION, SOLUTION INTRAMUSCULAR; INTRAVENOUS PRN
Status: DISCONTINUED | OUTPATIENT
Start: 2022-08-29 | End: 2022-08-29 | Stop reason: SDUPTHER

## 2022-08-29 RX ORDER — METRONIDAZOLE 500 MG/1
500 TABLET ORAL EVERY 8 HOURS SCHEDULED
Status: COMPLETED | OUTPATIENT
Start: 2022-08-29 | End: 2022-08-31

## 2022-08-29 RX ORDER — SODIUM CHLORIDE 9 MG/ML
INJECTION, SOLUTION INTRAVENOUS PRN
Status: DISCONTINUED | OUTPATIENT
Start: 2022-08-29 | End: 2022-08-29

## 2022-08-29 RX ORDER — OXYCODONE HYDROCHLORIDE 5 MG/1
5 TABLET ORAL EVERY 4 HOURS PRN
Status: DISPENSED | OUTPATIENT
Start: 2022-08-29 | End: 2022-08-30

## 2022-08-29 RX ORDER — DEXAMETHASONE SODIUM PHOSPHATE 4 MG/ML
INJECTION, SOLUTION INTRA-ARTICULAR; INTRALESIONAL; INTRAMUSCULAR; INTRAVENOUS; SOFT TISSUE PRN
Status: DISCONTINUED | OUTPATIENT
Start: 2022-08-29 | End: 2022-08-29 | Stop reason: SDUPTHER

## 2022-08-29 RX ORDER — OXYCODONE HYDROCHLORIDE 5 MG/1
TABLET ORAL
Status: COMPLETED
Start: 2022-08-29 | End: 2022-08-29

## 2022-08-29 RX ORDER — DOCUSATE SODIUM 100 MG/1
100 CAPSULE, LIQUID FILLED ORAL 2 TIMES DAILY
Status: DISCONTINUED | OUTPATIENT
Start: 2022-08-29 | End: 2022-09-01 | Stop reason: HOSPADM

## 2022-08-29 RX ADMIN — DOCUSATE SODIUM 100 MG: 100 CAPSULE, LIQUID FILLED ORAL at 20:37

## 2022-08-29 RX ADMIN — IBUPROFEN 600 MG: 600 TABLET ORAL at 12:33

## 2022-08-29 RX ADMIN — OXYCODONE 5 MG: 5 TABLET ORAL at 15:01

## 2022-08-29 RX ADMIN — SODIUM CHLORIDE, PRESERVATIVE FREE 10 ML: 5 INJECTION INTRAVENOUS at 16:21

## 2022-08-29 RX ADMIN — MORPHINE SULFATE 0.15 MG: 10 INJECTION, SOLUTION INTRAMUSCULAR; INTRAVENOUS at 07:41

## 2022-08-29 RX ADMIN — SODIUM CITRATE AND CITRIC ACID MONOHYDRATE 30 ML: 500; 334 SOLUTION ORAL at 07:18

## 2022-08-29 RX ADMIN — DIPHENHYDRAMINE HYDROCHLORIDE 12.5 MG: 50 INJECTION, SOLUTION INTRAMUSCULAR; INTRAVENOUS at 08:05

## 2022-08-29 RX ADMIN — Medication 909 ML/HR: at 08:03

## 2022-08-29 RX ADMIN — BUPIVACAINE HYDROCHLORIDE 1.6 MG: 7.5 INJECTION, SOLUTION SUBARACHNOID at 07:40

## 2022-08-29 RX ADMIN — OXYCODONE 5 MG: 5 TABLET ORAL at 20:37

## 2022-08-29 RX ADMIN — SODIUM CHLORIDE, POTASSIUM CHLORIDE, SODIUM LACTATE AND CALCIUM CHLORIDE: 600; 310; 30; 20 INJECTION, SOLUTION INTRAVENOUS at 07:22

## 2022-08-29 RX ADMIN — IBUPROFEN 600 MG: 600 TABLET ORAL at 23:58

## 2022-08-29 RX ADMIN — METRONIDAZOLE 500 MG: 500 TABLET ORAL at 12:33

## 2022-08-29 RX ADMIN — WATER 1000 MG: 1 INJECTION INTRAMUSCULAR; INTRAVENOUS; SUBCUTANEOUS at 23:58

## 2022-08-29 RX ADMIN — WATER 1000 MG: 1 INJECTION INTRAMUSCULAR; INTRAVENOUS; SUBCUTANEOUS at 16:22

## 2022-08-29 RX ADMIN — SODIUM CHLORIDE, POTASSIUM CHLORIDE, SODIUM LACTATE AND CALCIUM CHLORIDE: 600; 310; 30; 20 INJECTION, SOLUTION INTRAVENOUS at 08:14

## 2022-08-29 RX ADMIN — METRONIDAZOLE 500 MG: 500 TABLET ORAL at 20:36

## 2022-08-29 RX ADMIN — OXYCODONE 5 MG: 5 TABLET ORAL at 09:32

## 2022-08-29 RX ADMIN — CEFAZOLIN 3000 MG: 10 INJECTION, POWDER, FOR SOLUTION INTRAVENOUS at 06:30

## 2022-08-29 RX ADMIN — DEXAMETHASONE SODIUM PHOSPHATE 8 MG: 4 INJECTION, SOLUTION INTRAMUSCULAR; INTRAVENOUS at 08:05

## 2022-08-29 RX ADMIN — IBUPROFEN 600 MG: 600 TABLET ORAL at 18:38

## 2022-08-29 ASSESSMENT — PAIN DESCRIPTION - LOCATION
LOCATION: INCISION
LOCATION: ABDOMEN;INCISION
LOCATION: ABDOMEN
LOCATION: ABDOMEN;INCISION
LOCATION: ABDOMEN
LOCATION: ABDOMEN

## 2022-08-29 ASSESSMENT — PAIN SCALES - GENERAL
PAINLEVEL_OUTOF10: 4
PAINLEVEL_OUTOF10: 6
PAINLEVEL_OUTOF10: 5
PAINLEVEL_OUTOF10: 6
PAINLEVEL_OUTOF10: 6
PAINLEVEL_OUTOF10: 7

## 2022-08-29 ASSESSMENT — PAIN DESCRIPTION - ORIENTATION
ORIENTATION: LOWER

## 2022-08-29 ASSESSMENT — LIFESTYLE VARIABLES: SMOKING_STATUS: 0

## 2022-08-29 ASSESSMENT — PAIN - FUNCTIONAL ASSESSMENT
PAIN_FUNCTIONAL_ASSESSMENT: ACTIVITIES ARE NOT PREVENTED

## 2022-08-29 ASSESSMENT — PAIN DESCRIPTION - DESCRIPTORS
DESCRIPTORS: DISCOMFORT
DESCRIPTORS: DISCOMFORT
DESCRIPTORS: ACHING;DISCOMFORT;SORE
DESCRIPTORS: DISCOMFORT

## 2022-08-29 NOTE — PLAN OF CARE
Problem: ABCDS Injury Assessment  Goal: Absence of physical injury  Outcome: Progressing     Problem: Vaginal Birth or  Section  Goal: Fetal and maternal status remain reassuring during the birth process  Description:  Birth OB-Pregnancy care plan goal which identifies if the fetal and maternal status remain reassuring during the birth process  2022 1249 by Pavel Cee RN  Outcome: Completed  2022 0537 by Denver Dubois, RN  Outcome: Progressing

## 2022-08-29 NOTE — PROGRESS NOTES
Repeat LTCS of live baby boy at 65 by Dr. Jolly Arias. Apgars 9/9. Infant pink and vigorous. Being cared for at warmer by awaiting RN.

## 2022-08-29 NOTE — FLOWSHEET NOTE
Patient admitted to room 315 with father of infant at bedside. Patient oriented to room and floor and instructed to call phone number provided or use call light PRN. Admission packet and infant safe sleep reviewed with verbalized understanding. Patient refusing end tidal Co2 at this time. Patient is now resting in bed and infant taken to nursery.

## 2022-08-29 NOTE — OP NOTE
Operative Note      Patient: Maritza Bowens  YOB: 1985  MRN: [id number]    Date of Procedure: 2022    Pre-Op Diagnosis: High risk multigravid IUP at 44 weeks, previous  section, declines , iron deficiency anemia and pregnancy, obesity in pregnancy (pregravid BMI 40), PCOS, rubella equivocal history of spontaneous , history of gestational diabetes and prior pregnancy  Patient Active Problem List   Diagnosis    Obesity in pregnancy, antepartum    PCOS (polycystic ovarian syndrome)    High risk pregnancy, multigravida of advanced maternal age in third trimester - on 81 mg ASA    Declines  (vaginal birth after ) trial    Obesity affecting pregnancy in third trimester    H/O spontaneous , currently pregnant, third trimester    Iron deficiency anemia of pregnancy -on FeSO4 325 twice daily    Previous  section complicating pregnancy    History of gestational diabetes in prior pregnancy, currently pregnant in third trimester    Adult BMI 40.0-44.9 kg/sq m (pregravid BMI 40)    Rubella non-immune status, antepartum (EQUIVOCAL)    39 weeks gestation of pregnancy     Post-Op Diagnosis: Same, viable male infant, nuchal cord x1  Patient Active Problem List   Diagnosis    Obesity in pregnancy, antepartum    PCOS (polycystic ovarian syndrome)    High risk pregnancy, multigravida of advanced maternal age in third trimester - on 81 mg ASA    Declines  (vaginal birth after ) trial    Obesity affecting pregnancy in third trimester    H/O spontaneous , currently pregnant, third trimester    Iron deficiency anemia of pregnancy -on FeSO4 325 twice daily    Previous  section complicating pregnancy    History of gestational diabetes in prior pregnancy, currently pregnant in third trimester    Adult BMI 40.0-44.9 kg/sq m (pregravid BMI 40)    Rubella non-immune status, antepartum (EQUIVOCAL)    39 weeks gestation of pregnancy     delivery, entered with the Metzenbaum scissors. The peritoneal incision was extended superiorly and inferiorly with good visualization of the bladder. The bladder blade was then inserted and the vesicouterine peritoneum identified, grasped with pickups, entered sharply with Metzenbaum scissors. This incision was extended laterally and the bladder flap created digitally. The bladder blade was then reinserted and the lower uterine segment incised in a transverse fasion with the scalpel. The uterine incision was then extended laterally with blunt digital dissection. The bladder blade was then removed and the infant's head delivered atraumatically with the appropriate amount of fundal pressure. The nose and mouth were suctioned with bulb suction and the remaining infant was delivered. The cord was clamped and cut after a 30 second delay. The infant was handed off to the waiting nurses. The placenta was then removed with gentle cord traction and the uterus exteriorized and cleared of all clot and debris. The uterine incision was then repaired with 1 Chromic in a running, locked fashion. A second layer of the same suture was used to obtain excellent hemostasis. The bladder flap was repaired with 3-0 Vicryl in a running stitch and the uterus was returned to the abdomen. The gutters were then cleared of all clot and debris. The anterior abdominal wall peritoneum was closed with a running stitch of 3-0 Vicryl. The fascia was reapproximated with 1 Stratafix) in a running fashion. The subcutaneous tissue was reapproximated in two layers, using a running stitch of 3-0 plain suture. The skin was closed with the Insorb subcuticular stapling device; however the dermis and epidermis were not well approximated due to previous scar thickening. A running subcuticular stitch of 4-0 Stratafix was used to reapproximate the skin edge. The Mepilex Border Post-op Ag Dressing was reviewed a sterile bandage.  The patient tolerated the procedure well. Sponge, lap, needle, and instruments were correct x2. Three g of Ancef were given on call to the OR, followed by Pitocin drip after delivery of the placenta. The patient was taken to the Recovery Room in awake, stable, postoperative state.     Electronically signed by Alyx Varner MD on 8/29/2022 at 8:38 AM

## 2022-08-29 NOTE — ANESTHESIA PRE PROCEDURE
Department of Anesthesiology  Preprocedure Note       Name:  Hema Swanson   Age:  42 y.o.  :  1985                                          MRN:  [id number]         Date:  2022      Surgeon: Sarthak Blancas):  Rhoda Santo MD    Procedure: Procedure(s):   SECTION    Medications prior to admission:   Prior to Admission medications    Medication Sig Start Date End Date Taking? Authorizing Provider   ferrous sulfate (IRON 325) 325 (65 Fe) MG tablet Take 1 tablet by mouth 3 times daily (with meals) 22   JOSE Talbert CNM   nystatin (MYCOSTATIN) 416666 UNIT/GM powder Apply topically 4 times daily.  22   JOSE Talbert CNM   aspirin 81 MG chewable tablet Take 81 mg by mouth daily    Historical Provider, MD   docusate sodium (COLACE) 100 MG capsule Take 100 mg by mouth 2 times daily    Historical Provider, MD   vitamin D3 (CHOLECALCIFEROL) 25 MCG (1000 UT) TABS tablet Take 2 tablets by mouth daily 22   JOSE Waite CNP   Probiotic Product (PROBIOTIC-10 PO) Take by mouth    Historical Provider, MD   Multiple Vitamin (MULTIVITAMIN ADULT PO) Take by mouth    Historical Provider, MD       Current medications:    Current Facility-Administered Medications   Medication Dose Route Frequency Provider Last Rate Last Admin    lactated ringers infusion   IntraVENous Continuous Rhoda Santo MD        lactated ringers bolus  1,000 mL IntraVENous Once Rhoda Santo MD        sodium chloride flush 0.9 % injection 10 mL  10 mL IntraVENous 2 times per day Rhoda Santo MD        sodium chloride flush 0.9 % injection 10 mL  10 mL IntraVENous PRN Rhoda Santo MD        0.9 % sodium chloride infusion   IntraVENous PRN Rhoda Santo MD        citric acid-sodium citrate (BICITRA) solution 30 mL  30 mL Oral Once Rhoda Santo MD        ceFAZolin (ANCEF) 3,000 mg in dextrose 5 % 100 mL IVPB  3,000 mg IntraVENous Once Tobacco Use    Smoking status: Former    Smokeless tobacco: Never   Substance Use Topics    Alcohol use: No     Comment: occasonally                                Counseling given: Not Answered      Vital Signs (Current):   Vitals:    08/29/22 0524 08/29/22 0613 08/29/22 0626   BP:  (!) 157/84 (!) 168/87   Pulse:  76 71   Resp:  16    Temp:  36.8 °C (98.3 °F)    TempSrc:  Oral    SpO2:  100%    Weight: 275 lb (124.7 kg)     Height: 5' 9\" (1.753 m)                                                BP Readings from Last 3 Encounters:   08/29/22 (!) 168/87   08/26/22 130/84   08/17/22 130/82       NPO Status: Time of last liquid consumption: 2100                        Time of last solid consumption: 2100                        Date of last liquid consumption: 08/28/22                        Date of last solid food consumption: 08/28/22    BMI:   Wt Readings from Last 3 Encounters:   08/29/22 275 lb (124.7 kg)   08/26/22 292 lb (132.5 kg)   08/17/22 289 lb 12.8 oz (131.5 kg)     Body mass index is 40.61 kg/m².     CBC:   Lab Results   Component Value Date/Time    WBC 8.8 08/12/2022 01:45 PM    RBC 3.67 08/12/2022 01:45 PM    HGB 11.1 08/12/2022 01:45 PM    HCT 34.1 08/12/2022 01:45 PM    MCV 92.9 08/12/2022 01:45 PM    RDW 15.1 08/12/2022 01:45 PM     08/12/2022 01:45 PM       CMP:   Lab Results   Component Value Date/Time     08/06/2022 02:20 AM    K 3.8 08/06/2022 02:20 AM     08/06/2022 02:20 AM    CO2 19 08/06/2022 02:20 AM    BUN 5 08/06/2022 02:20 AM    CREATININE 0.5 08/06/2022 02:20 AM    GFRAA >60 08/06/2022 02:20 AM    LABGLOM >60 08/06/2022 02:20 AM    GLUCOSE 96 08/06/2022 02:20 AM    PROT 6.0 08/06/2022 02:20 AM    CALCIUM 10.4 08/06/2022 02:20 AM    BILITOT 0.3 08/06/2022 02:20 AM    ALKPHOS 116 08/06/2022 02:20 AM    AST 26 08/06/2022 02:20 AM    ALT 14 08/06/2022 02:20 AM       POC Tests: No results for input(s): POCGLU, POCNA, POCK, POCCL, POCBUN, POCHEMO, POCHCT in the last 72 hours.    Coags: No results found for: PROTIME, INR, APTT    HCG (If Applicable):   Lab Results   Component Value Date    PREGTESTUR POSITIVE 05/10/2016        ABGs: No results found for: PHART, PO2ART, CZC5JIV, IBD1TAC, BEART, R8GUOWTO     Type & Screen (If Applicable):  No results found for: LABABO, LABRH    Drug/Infectious Status (If Applicable):  No results found for: HIV, HEPCAB    COVID-19 Screening (If Applicable): No results found for: COVID19        Anesthesia Evaluation  Patient summary reviewed and Nursing notes reviewed history of anesthetic complications ( previous spinal did not take.):   Airway: Mallampati: III  TM distance: >3 FB   Neck ROM: full  Mouth opening: > = 3 FB   Dental: normal exam     Comment: Pt states no loose or chipped teeth. Pulmonary:normal exam  breath sounds clear to auscultation      (-) not a current smoker ( former)          Patient did not smoke on day of surgery. Cardiovascular:  Exercise tolerance: good (>4 METS),           Rhythm: regular  Rate: normal           Beta Blocker:  Not on Beta Blocker         Neuro/Psych:   Negative Neuro/Psych ROS              GI/Hepatic/Renal:   (+) GERD:, morbid obesity          Endo/Other:    (+) Diabetes (gestational diabetes), . Pt had no PAT visit        ROS comment: PCOS  DUB- dysfunctional   Anemia   Endocevical polyp  Abdominal:             Vascular: negative vascular ROS. Other Findings:           Anesthesia Plan      spinal     ASA 3     (#20g Right hand )  Induction: intravenous. MIPS: Postoperative opioids intended and Prophylactic antiemetics administered. Anesthetic plan and risks discussed with patient. Use of blood products discussed with patient whom consented to blood products. Plan discussed with CRNA and attending.     Attending anesthesiologist reviewed and agrees with Preprocedure content                Lino Ramos RN   8/29/2022

## 2022-08-29 NOTE — H&P
Department of Obstetrics and Gynecology  Labor and Delivery  History & Physical    Patient:  Benton Fatima     Admit Date:  2022  5:06 AM  Medical Record Number:  [id number]    CHIEF COMPLAINT: High risk elderly multigravid at 44 weeks for repeat low-transverse  section    PROBLEM LIST:     Patient Active Problem List   Diagnosis    Obesity in pregnancy, antepartum    PCOS (polycystic ovarian syndrome)    High risk pregnancy, multigravida of advanced maternal age in third trimester - on 80 mg ASA    Declines  (vaginal birth after ) trial    Obesity affecting pregnancy in third trimester    H/O spontaneous , currently pregnant, third trimester    Iron deficiency anemia of pregnancy -on FeSO4 325 twice daily    Previous  section complicating pregnancy    History of gestational diabetes in prior pregnancy, currently pregnant in third trimester    Adult BMI 40.0-44.9 kg/sq m (pregravid BMI 40)    Rubella non-immune status, antepartum (EQUIVOCAL)    39 weeks gestation of pregnancy           HISTORY OF PRESENT ILLNESS:    The patient is a 42 y.o. W female [id number] at 39w0d. Patient presents with a history of 2 prior  sections for a repeat low transverse  delivery. Her initial  section was in 2016 for nonreassuring fetal heart tracing (nuchal cord x3). The second was a repeat in 2019 as she had declined a TOLAC. After review of the risks, benefits and alternatives she is requesting a repeat low-transverse  delivery. She is having some intermittent cramping but denies contractions, leaking fluid and vaginal bleeding. She has no symptoms of UTI or PIH. There is good fetal movement.     ESTIMATED DUE DATE: Estimated Date of Delivery: 22    PRENATAL CARE:  Complicated by: See HPI and problem list  GBS: negative    Past OB History  OB History          4    Para   2    Term   2            AB   1    Living   2         SAB   1    IAB Ectopic        Molar        Multiple   0    Live Births   2                Past Medical History:        Diagnosis Date    Anemia     Diet controlled gestational diabetes mellitus (GDM) in third trimester 2019    DUB (dysfunctional uterine bleeding) 2013    Endocervical polyp 2015    Irregular menses 2015       Past Surgical History:        Procedure Laterality Date     SECTION       SECTION N/A 12/10/2019     SECTION performed by Antonino Ortega MD at [facility] L[facility] OR       Allergies: Other, Ketorolac, Ondansetron, Toradol [ketorolac tromethamine], and Zofran [ondansetron hcl]    Social History:    Social History     Socioeconomic History    Marital status:      Spouse name: Not on file    Number of children: Not on file    Years of education: Not on file    Highest education level: Not on file   Occupational History    Not on file   Tobacco Use    Smoking status: Former    Smokeless tobacco: Never   Substance and Sexual Activity    Alcohol use: No     Comment: occasonally    Drug use: No    Sexual activity: Yes     Partners: Male   Other Topics Concern    Not on file   Social History Narrative    Not on file     Social Determinants of Health     Financial Resource Strain: Not on file   Food Insecurity: Not on file   Transportation Needs: Not on file   Physical Activity: Not on file   Stress: Not on file   Social Connections: Not on file   Intimate Partner Violence: Not on file   Housing Stability: Not on file       Family History:       Problem Relation Age of Onset    Diabetes Mother     Diabetes Father     Heart Disease Father     Other Father        Medications Prior to Admission:  Medications Prior to Admission: ferrous sulfate (IRON 325) 325 (65 Fe) MG tablet, Take 1 tablet by mouth 3 times daily (with meals)  nystatin (MYCOSTATIN) 843325 UNIT/GM powder, Apply topically 4 times daily.   aspirin 81 MG chewable tablet, Take 81 mg by mouth daily  docusate sodium (COLACE) 100 MG capsule, Take 100 mg by mouth 2 times daily  vitamin D3 (CHOLECALCIFEROL) 25 MCG (1000 UT) TABS tablet, Take 2 tablets by mouth daily  Probiotic Product (PROBIOTIC-10 PO), Take by mouth  Multiple Vitamin (MULTIVITAMIN ADULT PO), Take by mouth    REVIEW OF SYSTEMS:  CONSTITUTIONAL:  negative  RESPIRATORY:  negative  CARDIOVASCULAR:  negative  GASTROINTESTINAL:  negative  ALLERGIC/IMMUNOLOGIC:  negative  NEUROLOGICAL:  negative  BEHAVIOR/PSYCH:  negative    PHYSICAL EXAM:  Vitals:    08/29/22 0524 08/29/22 0613 08/29/22 0626 08/29/22 0657   BP:  (!) 157/84 (!) 168/87 (!) 167/87   Pulse:  76 71 75   Resp:  16     Temp:  98.3 °F (36.8 °C)     TempSrc:  Oral     SpO2:  100%     Weight: 275 lb (124.7 kg)      Height: 5' 9\" (1.753 m)        General appearance:  awake, alert, cooperative, no apparent distress, and appears stated age  Neurologic:  Awake, alert, oriented to name, place and time. Skin: warm, dry, normal color, no rashes  Head:  NC,AT,NT  Eyes:  Sclerae white, pupils equal and reactive, red reflex normal bilaterally  Ears:  Well-positioned, well-formed pinnae; Hearing: intact  Nose:  Clear, normal mucosa  Throat:  Lips, tongue and mucosa are pink, moist and intact; no exudate  Neck:  Supple, symmetrical  Heart:  Regular rate & rhythm, S1 S2, no murmurs, rubs, or gallops  Lungs:  No increased work of breathing, good air exchange, CTA b/l. Abdomen:  Soft, non tender, gravid, consistent with her gestational age, EFW by Leopald's manouever was 8#  Extremities: No clubbing, cyanosis, cords; No calf tenderness; Edema: trace  Pulses:  Strong equal distal pulses, brisk capillary refill  Fetal heart rate:  Reassuring.   Pelvis:  Deferred   Contraction frequency:  0 minutes  Membranes:  Intact    Labs:  Recent Results (from the past 72 hour(s))   CBC    Collection Time: 08/29/22  5:11 AM   Result Value Ref Range    WBC 9.2 4.5 - 11.5 E9/L    RBC 3.62 3.50 - 5.50 E12/L    Hemoglobin 11.3 (L) 11.5 - 15.5 g/dL    Hematocrit 33.1 (L) 34.0 - 48.0 %    MCV 91.4 80.0 - 99.9 fL    MCH 31.2 26.0 - 35.0 pg    MCHC 34.1 32.0 - 34.5 %    RDW 15.1 (H) 11.5 - 15.0 fL    Platelets 720 836 - 397 E9/L    MPV 11.5 7.0 - 12.0 fL   DRUG SCREEN MULTI URINE    Collection Time: 22  5:11 AM   Result Value Ref Range    Drug Screen Comment: see below    TYPE AND SCREEN    Collection Time: 22  5:55 AM   Result Value Ref Range    ABO/Rh A POS     Antibody Screen NEG        ASSESSMENT:  42 y.o.  W female at 39w0d [id number]  Previous  section x2, declines   Pregravid BMI 40  PCOS  History of GDM prior pregnancy  Rubella equivocal  Iron deficiency anemia pregnancy  History of spontaneous   Patient Active Problem List   Diagnosis    Obesity in pregnancy, antepartum    PCOS (polycystic ovarian syndrome)    High risk pregnancy, multigravida of advanced maternal age in third trimester - on 81 mg ASA    Declines  (vaginal birth after ) trial    Obesity affecting pregnancy in third trimester    H/O spontaneous , currently pregnant, third trimester    Iron deficiency anemia of pregnancy -on FeSO4 325 twice daily    Previous  section complicating pregnancy    History of gestational diabetes in prior pregnancy, currently pregnant in third trimester    Adult BMI 40.0-44.9 kg/sq m (pregravid BMI 40)    Rubella non-immune status, antepartum (EQUIVOCAL)    39 weeks gestation of pregnancy       Fetus: Reassuring  GBS: negative    PLAN:  Orders Placed This Encounter   Procedures    CBC    DRUG SCREEN MULTI URINE    Diet NPO    Vital signs per unit routine    Notify physician for nonreassuring fetal status, abnormal lab results    Up as tolerated    Monitor and record Fetal Heart rate until prepped if laboring    Insert Bell catheter    Clip abdominal/pubic hair at operative site    Abdominal prep    Monitor fetal heart rate (external)    External uterine contraction monitoring

## 2022-08-29 NOTE — LACTATION NOTE
Experienced mom states baby latched well with the shield, sleepy now. Encouraged skin to skin and frequent attempts at breast to stimulate milk production. Instructed on normal infant behavior in the first 12-24 hours and importance of stimulating the baby frequently to eat during this time. Reviewed hand expression, and encouraged to hand express drops of colostrum when baby is sleepy. Instructed that baby may also feed 8-12 times a day- cluster feeding at times- as her milk supply is being established. Instructed on benefits of skin to skin and avoidance of pacifier / artificial nipple use until breastfeeding is well established. Educated on making sure infant has an open airway while breastfeeding and skin to skin. Instructed on hunger cues and waking techniques to try. Reviewed signs of adequate I & O; allow baby to feed ad farooq and not to limit time at breast. Breastfeeding booklet provided with review of its contents. Encouraged to call with any concerns. Mom has a breast pump for home use, hand pump given.

## 2022-08-29 NOTE — PLAN OF CARE
Problem: ABCDS Injury Assessment  Goal: Absence of physical injury  2022 1249 by Theresa Rowland RN  Outcome: Progressing  2022 1249 by Theresa Rowland RN  Outcome: Progressing     Problem: Postpartum  Goal: Experiences normal postpartum course  Description:  Postpartum OB-Pregnancy care plan goal which identifies if the mother is experiencing a normal postpartum course  Outcome: Progressing  Goal: Appropriate maternal -  bonding  Description:  Postpartum OB-Pregnancy care plan goal which identifies if the mother and  are bonding appropriately  Outcome: Progressing  Goal: Establishment of infant feeding pattern  Description:  Postpartum OB-Pregnancy care plan goal which identifies if the mother is establishing a feeding pattern with their   Outcome: Progressing  Goal: Incisions, wounds, or drain sites healing without S/S of infection  Outcome: Progressing     Problem: Pain  Goal: Verbalizes/displays adequate comfort level or baseline comfort level  Outcome: Progressing     Problem: Infection - Adult  Goal: Absence of infection at discharge  Outcome: Progressing  Goal: Absence of infection during hospitalization  Outcome: Progressing  Goal: Absence of fever/infection during anticipated neutropenic period  Outcome: Progressing     Problem: Discharge Planning  Goal: Discharge to home or other facility with appropriate resources  Outcome: Progressing

## 2022-08-29 NOTE — ANESTHESIA PROCEDURE NOTES
Spinal Block    Patient location during procedure: OR  End time: 8/29/2022 7:40 AM  Reason for block: primary anesthetic  Staffing  Performed: resident/CRNA   Resident/CRNA: JOSE Connell CRNA  Other anesthesia staff: Danie Mg RN  Spinal Block  Patient position: sitting  Prep: ChloraPrep  Patient monitoring: continuous pulse ox and frequent blood pressure checks  Approach: midline  Location: L3/L4  Provider prep: mask and sterile gloves  Local infiltration: lidocaine  Needle  Needle type: Pencan   Needle gauge: 25 G  Needle length: 3.5 in  Expiration date: 12/24/2022  Assessment  Sensory level: T4  Events: cerebrospinal fluid  Swirl obtained: Yes  CSF: clear  Attempts: 1  Hemodynamics: stable  Preanesthetic Checklist  Completed: patient identified, IV checked, site marked, risks and benefits discussed, surgical/procedural consents, equipment checked, pre-op evaluation, timeout performed, anesthesia consent given, oxygen available, monitors applied/VS acknowledged, fire risk safety assessment completed and verbalized and blood product R/B/A discussed and consented

## 2022-08-29 NOTE — PROGRESS NOTES
Pt , 39 weeks, came in for a scheduled c/s due to prior c/s x2. Last US scan baby was presented transverse according to pt. Pt states positive FM and occasional CTX. Pt denies LOF and VB. VSS. /84. Pt resting comfortably in bed.

## 2022-08-30 LAB
BASOPHILS ABSOLUTE: 0.02 E9/L (ref 0–0.2)
BASOPHILS RELATIVE PERCENT: 0.2 % (ref 0–2)
EOSINOPHILS ABSOLUTE: 0.07 E9/L (ref 0.05–0.5)
EOSINOPHILS RELATIVE PERCENT: 0.7 % (ref 0–6)
HCT VFR BLD CALC: 32.4 % (ref 34–48)
HEMOGLOBIN: 10.5 G/DL (ref 11.5–15.5)
IMMATURE GRANULOCYTES #: 0.1 E9/L
IMMATURE GRANULOCYTES %: 1 % (ref 0–5)
LYMPHOCYTES ABSOLUTE: 1.58 E9/L (ref 1.5–4)
LYMPHOCYTES RELATIVE PERCENT: 15.1 % (ref 20–42)
MCH RBC QN AUTO: 30.2 PG (ref 26–35)
MCHC RBC AUTO-ENTMCNC: 32.4 % (ref 32–34.5)
MCV RBC AUTO: 93.1 FL (ref 80–99.9)
MONOCYTES ABSOLUTE: 0.65 E9/L (ref 0.1–0.95)
MONOCYTES RELATIVE PERCENT: 6.2 % (ref 2–12)
NEUTROPHILS ABSOLUTE: 8.05 E9/L (ref 1.8–7.3)
NEUTROPHILS RELATIVE PERCENT: 76.8 % (ref 43–80)
PDW BLD-RTO: 15.3 FL (ref 11.5–15)
PLATELET # BLD: 125 E9/L (ref 130–450)
PMV BLD AUTO: 11.6 FL (ref 7–12)
RBC # BLD: 3.48 E12/L (ref 3.5–5.5)
WBC # BLD: 10.5 E9/L (ref 4.5–11.5)

## 2022-08-30 PROCEDURE — 6370000000 HC RX 637 (ALT 250 FOR IP): Performed by: OBSTETRICS & GYNECOLOGY

## 2022-08-30 PROCEDURE — 85025 COMPLETE CBC W/AUTO DIFF WBC: CPT

## 2022-08-30 PROCEDURE — 1220000000 HC SEMI PRIVATE OB R&B

## 2022-08-30 PROCEDURE — 36415 COLL VENOUS BLD VENIPUNCTURE: CPT

## 2022-08-30 RX ADMIN — IBUPROFEN 600 MG: 600 TABLET ORAL at 20:17

## 2022-08-30 RX ADMIN — OXYCODONE 5 MG: 5 TABLET ORAL at 03:22

## 2022-08-30 RX ADMIN — IBUPROFEN 600 MG: 600 TABLET ORAL at 13:40

## 2022-08-30 RX ADMIN — METRONIDAZOLE 500 MG: 500 TABLET ORAL at 13:40

## 2022-08-30 RX ADMIN — METFORMIN HYDROCHLORIDE 1 TABLET: 500 TABLET, EXTENDED RELEASE ORAL at 10:49

## 2022-08-30 RX ADMIN — OXYCODONE 10 MG: 5 TABLET ORAL at 23:18

## 2022-08-30 RX ADMIN — OXYCODONE 10 MG: 5 TABLET ORAL at 10:49

## 2022-08-30 RX ADMIN — ACETAMINOPHEN 1000 MG: 500 TABLET ORAL at 21:47

## 2022-08-30 RX ADMIN — OXYCODONE 10 MG: 5 TABLET ORAL at 15:34

## 2022-08-30 RX ADMIN — ACETAMINOPHEN 1000 MG: 500 TABLET ORAL at 10:50

## 2022-08-30 RX ADMIN — DOCUSATE SODIUM 100 MG: 100 CAPSULE, LIQUID FILLED ORAL at 20:17

## 2022-08-30 RX ADMIN — METRONIDAZOLE 500 MG: 500 TABLET ORAL at 06:09

## 2022-08-30 RX ADMIN — DOCUSATE SODIUM 100 MG: 100 CAPSULE, LIQUID FILLED ORAL at 10:50

## 2022-08-30 RX ADMIN — IBUPROFEN 600 MG: 600 TABLET ORAL at 06:09

## 2022-08-30 RX ADMIN — Medication 1 CAPSULE: at 06:10

## 2022-08-30 RX ADMIN — METRONIDAZOLE 500 MG: 500 TABLET ORAL at 21:47

## 2022-08-30 ASSESSMENT — PAIN SCALES - GENERAL
PAINLEVEL_OUTOF10: 8
PAINLEVEL_OUTOF10: 3
PAINLEVEL_OUTOF10: 8
PAINLEVEL_OUTOF10: 4
PAINLEVEL_OUTOF10: 6
PAINLEVEL_OUTOF10: 6
PAINLEVEL_OUTOF10: 5

## 2022-08-30 ASSESSMENT — PAIN DESCRIPTION - LOCATION
LOCATION: ABDOMEN
LOCATION: ABDOMEN
LOCATION: INCISION;ABDOMEN
LOCATION: ABDOMEN;INCISION
LOCATION: ABDOMEN;INCISION
LOCATION: ABDOMEN
LOCATION: ABDOMEN

## 2022-08-30 ASSESSMENT — PAIN - FUNCTIONAL ASSESSMENT
PAIN_FUNCTIONAL_ASSESSMENT: ACTIVITIES ARE NOT PREVENTED

## 2022-08-30 ASSESSMENT — PAIN DESCRIPTION - ORIENTATION
ORIENTATION: LOWER
ORIENTATION: MID

## 2022-08-30 ASSESSMENT — PAIN DESCRIPTION - DESCRIPTORS
DESCRIPTORS: ACHING;DISCOMFORT;SORE
DESCRIPTORS: DISCOMFORT
DESCRIPTORS: ACHING;DISCOMFORT;SORE
DESCRIPTORS: DISCOMFORT
DESCRIPTORS: ACHING;DISCOMFORT;SORE
DESCRIPTORS: DISCOMFORT

## 2022-08-30 NOTE — LACTATION NOTE
Mom continues to breastfeed her baby but admits to short feedings. Assisted mom in waking techniques and positioning and latch. Taught mom hand expression to keep baby interested. Encouraged mom to provide breast support throughout the entire feed. Baby was nursing for ten minutes before I left the room. Audible swallows were observed. Encouraged mom to call me for further assistance.

## 2022-08-30 NOTE — PLAN OF CARE
Problem: Postpartum  Goal: Experiences normal postpartum course  Description:  Postpartum OB-Pregnancy care plan goal which identifies if the mother is experiencing a normal postpartum course  8/30/2022 0139 by Aniket Murillo RN  Outcome: Progressing     Problem: Pain  Goal: Verbalizes/displays adequate comfort level or baseline comfort level  8/30/2022 0139 by Anikte Murillo RN  Outcome: Progressing

## 2022-08-30 NOTE — ANESTHESIA POSTPROCEDURE EVALUATION
Department of Anesthesiology  Postprocedure Note    Patient: Jonn Ward  MRN: [id number]  [place]: 1985  Date of evaluation: 2022      Procedure Summary     Date: 22 Room / Location: [facility] OR 01 / [facility]    Anesthesia Start: 5669 Anesthesia Stop: 8460    Procedure:  SECTION (Uterus) Diagnosis:       S/P repeat low transverse       (repeat csection)    Surgeons: Alberto Khan MD Responsible Provider: Ashley Thompson DO    Anesthesia Type: spinal ASA Status: 3          Anesthesia Type: No value filed.     Marquez Phase I: Marquez Score: 9    Marquez Phase II:        Anesthesia Post Evaluation    Patient location during evaluation: bedside  Patient participation: complete - patient participated  Level of consciousness: awake and alert  Pain score: 0  Airway patency: patent  Nausea & Vomiting: no nausea  Complications: no  Cardiovascular status: blood pressure returned to baseline and hemodynamically stable  Respiratory status: acceptable  Hydration status: euvolemic

## 2022-08-30 NOTE — CARE COORDINATION
SW Discharge Planning   SW received consult for \" PPD score of 12\"    SHILPI met with Benton Fatima and provided her with information, education and resources on post partum depression. Doug Poole reported that she currently works full time and has three children at home. We discussed self care and things she can do at home to relieve stress. Doug Poole reported she has a good support system.     Electronically signed by AVERY Pedraza on 8/30/2022 at 9:21 AM

## 2022-08-30 NOTE — PROGRESS NOTES
[facility] [place] Hearing screening results were discussed with parent. Questions answered. Brochure given to parent. Advised to monitor developmental milestones and contact physician for any concerns.    Alina Jones

## 2022-08-31 PROBLEM — D62 POSTOPERATIVE ANEMIA DUE TO ACUTE BLOOD LOSS: Status: ACTIVE | Noted: 2022-08-30

## 2022-08-31 PROCEDURE — 6370000000 HC RX 637 (ALT 250 FOR IP): Performed by: OBSTETRICS & GYNECOLOGY

## 2022-08-31 PROCEDURE — 1220000000 HC SEMI PRIVATE OB R&B

## 2022-08-31 RX ADMIN — OXYCODONE 10 MG: 5 TABLET ORAL at 23:07

## 2022-08-31 RX ADMIN — DOCUSATE SODIUM 100 MG: 100 CAPSULE, LIQUID FILLED ORAL at 08:41

## 2022-08-31 RX ADMIN — ACETAMINOPHEN 1000 MG: 500 TABLET ORAL at 14:58

## 2022-08-31 RX ADMIN — SIMETHICONE 80 MG: 80 TABLET, CHEWABLE ORAL at 08:41

## 2022-08-31 RX ADMIN — METFORMIN HYDROCHLORIDE 1 TABLET: 500 TABLET, EXTENDED RELEASE ORAL at 08:40

## 2022-08-31 RX ADMIN — IBUPROFEN 600 MG: 600 TABLET ORAL at 02:23

## 2022-08-31 RX ADMIN — ACETAMINOPHEN 1000 MG: 500 TABLET ORAL at 05:47

## 2022-08-31 RX ADMIN — IBUPROFEN 600 MG: 600 TABLET ORAL at 20:23

## 2022-08-31 RX ADMIN — Medication 1 CAPSULE: at 10:52

## 2022-08-31 RX ADMIN — METRONIDAZOLE 500 MG: 500 TABLET ORAL at 05:47

## 2022-08-31 RX ADMIN — DOCUSATE SODIUM 100 MG: 100 CAPSULE, LIQUID FILLED ORAL at 20:24

## 2022-08-31 RX ADMIN — IBUPROFEN 600 MG: 600 TABLET ORAL at 12:30

## 2022-08-31 RX ADMIN — SIMETHICONE 80 MG: 80 TABLET, CHEWABLE ORAL at 05:47

## 2022-08-31 RX ADMIN — OXYCODONE 10 MG: 5 TABLET ORAL at 08:41

## 2022-08-31 RX ADMIN — OXYCODONE 10 MG: 5 TABLET ORAL at 17:41

## 2022-08-31 ASSESSMENT — PAIN DESCRIPTION - DESCRIPTORS
DESCRIPTORS: ACHING;SORE;TENDER
DESCRIPTORS: ACHING;SORE;TENDER
DESCRIPTORS: DISCOMFORT;CRAMPING;TENDER
DESCRIPTORS: ACHING;DISCOMFORT;SORE
DESCRIPTORS: DISCOMFORT;ACHING;SORE
DESCRIPTORS: ACHING;DISCOMFORT;SORE
DESCRIPTORS: ACHING;DISCOMFORT;SORE

## 2022-08-31 ASSESSMENT — PAIN - FUNCTIONAL ASSESSMENT
PAIN_FUNCTIONAL_ASSESSMENT: ACTIVITIES ARE NOT PREVENTED

## 2022-08-31 ASSESSMENT — PAIN DESCRIPTION - LOCATION
LOCATION: ABDOMEN;INCISION
LOCATION: ABDOMEN
LOCATION: ABDOMEN;INCISION
LOCATION: ABDOMEN;INCISION
LOCATION: ABDOMEN
LOCATION: ABDOMEN;INCISION
LOCATION: ABDOMEN;INCISION

## 2022-08-31 ASSESSMENT — PAIN DESCRIPTION - ORIENTATION
ORIENTATION: LOWER

## 2022-08-31 ASSESSMENT — PAIN SCALES - GENERAL
PAINLEVEL_OUTOF10: 4
PAINLEVEL_OUTOF10: 7
PAINLEVEL_OUTOF10: 7
PAINLEVEL_OUTOF10: 3
PAINLEVEL_OUTOF10: 4
PAINLEVEL_OUTOF10: 4
PAINLEVEL_OUTOF10: 6

## 2022-08-31 NOTE — PROGRESS NOTES
POD #1    Patient:  Hema Swanson     Admit Date:  8/29/2022  5:06 AM  Medical Record Number:  [id number]   Today's Date: 8/30/2022    S: No complaints; tolerating diet: yes -general; ambulating well: yes -up in room, yet to ambulate in halls; voiding without difficulty:  yes -has no urinary complaints; bm: denies; flatus, but feels that his current: denies; pain meds appropriate: yes -ibuprofen and Roxicodone to supplement her Duramorph spinal; vaginal bleeding: Less than a period.     O:   Vitals:    08/29/22 2050 08/29/22 2352 08/30/22 0520 08/30/22 0708   BP: 134/76 (!) 147/75 (!) 140/67 (!) 157/78   Pulse: 63 59 73 65   Resp: 16 16 16 16   Temp: 97.7 °F (36.5 °C) 97.6 °F (36.4 °C) 97.7 °F (36.5 °C) 97.9 °F (36.6 °C)   TempSrc: Oral Oral Oral Oral   SpO2: 96% 96% 98% 98%   Weight:       Height:         Gen: A&O, cooperative, pleasant   Heart: RRR   Lungs: CTA b/l   Abd; soft, NT, non distended, +BS  Back: no CVA or paraspinal tenderness   Ext: No clubbing, cyanosis, edema:1+ , no cords palpable, no calf tenderness   Neuro: intact   Inc: clean, dry, intact with Mepilex, no drainage  Uterus: firm, well contracted, nt   Brigid pad: staining only, thin lochia    Recent Results (from the past 72 hour(s))   CBC    Collection Time: 08/29/22  5:11 AM   Result Value Ref Range    WBC 9.2 4.5 - 11.5 E9/L    RBC 3.62 3.50 - 5.50 E12/L    Hemoglobin 11.3 (L) 11.5 - 15.5 g/dL    Hematocrit 33.1 (L) 34.0 - 48.0 %    MCV 91.4 80.0 - 99.9 fL    MCH 31.2 26.0 - 35.0 pg    MCHC 34.1 32.0 - 34.5 %    RDW 15.1 (H) 11.5 - 15.0 fL    Platelets 890 391 - 313 E9/L    MPV 11.5 7.0 - 12.0 fL   DRUG SCREEN MULTI URINE    Collection Time: 08/29/22  5:11 AM   Result Value Ref Range    Amphetamine Screen, Urine NOT DETECTED Negative <1000 ng/mL    Barbiturate Screen, Ur NOT DETECTED Negative < 200 ng/mL    Benzodiazepine Screen, Urine NOT DETECTED Negative < 200 ng/mL    Cannabinoid Scrn, Ur NOT DETECTED Negative < 50ng/mL    Cocaine Metabolite Screen, Urine NOT DETECTED Negative < 300 ng/mL    Opiate Scrn, Ur NOT DETECTED Negative < 300ng/mL    PCP Screen, Urine NOT DETECTED Negative < 25 ng/mL    Methadone Screen, Urine NOT DETECTED Negative <300 ng/mL    Oxycodone Urine NOT DETECTED Negative <100 ng/mL    FENTANYL SCREEN, URINE NOT DETECTED Negative <1 ng/mL    Drug Screen Comment: see below    TYPE AND SCREEN    Collection Time: 22  5:55 AM   Result Value Ref Range    ABO/Rh A POS     Antibody Screen NEG    CBC auto differential    Collection Time: 22  5:43 AM   Result Value Ref Range    WBC 10.5 4.5 - 11.5 E9/L    RBC 3.48 (L) 3.50 - 5.50 E12/L    Hemoglobin 10.5 (L) 11.5 - 15.5 g/dL    Hematocrit 32.4 (L) 34.0 - 48.0 %    MCV 93.1 80.0 - 99.9 fL    MCH 30.2 26.0 - 35.0 pg    MCHC 32.4 32.0 - 34.5 %    RDW 15.3 (H) 11.5 - 15.0 fL    Platelets 938 (L) 548 - 450 E9/L    MPV 11.6 7.0 - 12.0 fL    Neutrophils % 76.8 43.0 - 80.0 %    Immature Granulocytes % 1.0 0.0 - 5.0 %    Lymphocytes % 15.1 (L) 20.0 - 42.0 %    Monocytes % 6.2 2.0 - 12.0 %    Eosinophils % 0.7 0.0 - 6.0 %    Basophils % 0.2 0.0 - 2.0 %    Neutrophils Absolute 8.05 (H) 1.80 - 7.30 E9/L    Immature Granulocytes # 0.10 E9/L    Lymphocytes Absolute 1.58 1.50 - 4.00 E9/L    Monocytes Absolute 0.65 0.10 - 0.95 E9/L    Eosinophils Absolute 0.07 0.05 - 0.50 E9/L    Basophils Absolute 0.02 0.00 - 0.20 E9/L       A: 42 y.o. female [id number] at 39w0d  POD#1 S/P repeat low transverse  section   Postoperative anemia of acute blood loss superimposed on to iron deficiency anemia of pregnancy  Rubella equivocal  Patient Active Problem List   Diagnosis    Obesity in pregnancy, antepartum    PCOS (polycystic ovarian syndrome)    High risk pregnancy, multigravida of advanced maternal age in third trimester - on 81 mg ASA    Declines  (vaginal birth after ) trial    Obesity affecting pregnancy in third trimester    H/O spontaneous , currently pregnant, third trimester Iron deficiency anemia of pregnancy -on FeSO4 325 twice daily    Previous  section complicating pregnancy    History of gestational diabetes in prior pregnancy, currently pregnant in third trimester    Adult BMI 40.0-44.9 kg/sq m (pregravid BMI 40)    Rubella non-immune status, antepartum (EQUIVOCAL)    39 weeks gestation of pregnancy     delivery, delivered, current hospitalization    Term birth of  male    Nuchal cord, delivered, current hospitalization    Postoperative anemia due to acute blood loss       P: Routine post-op care. D/C IV, IVF's, IV meds and Bell-in progress  Advance diet and activity as tolerated -in progress. Initiate PO pain meds -in progress (Tylenol, ibuprofen and Roxicodone). Resume prenatal vitamins with iron daily. Complete course of metronidazole 500 mg every 8 hours x48 hours. Rubella vaccine prior to discharge.     Lilliana Malagon MD FACOG  2022 7:35 AM

## 2022-08-31 NOTE — LACTATION NOTE
Pt is a multip familiar with use of nipple shield. She c/o tenderness in the nipple. She is experienced but tense and declines help to make adjustments for improved latching. She is pumping intermittently and has 4-6 ounces of pumped milk in [facility] fridge.

## 2022-08-31 NOTE — DISCHARGE INSTRUCTIONS
How to Breastfeed: Step by Step  Overview  Breastfeeding is a skill that gets better with practice. Breastfeed your baby whenever your baby is hungry. In the first 2 weeks, your baby will feed at least 8 times in a 24-hour period. Here is a step-by-step guide on how to breastfeed. It shows just one position that you can use for breastfeeding. Talk to your doctor, midwife, or lactation consultant if you are having trouble getting your baby to latch on. How to Breastfeed  Get ready to breastfeed    Sit in a comfortable chair. Support your baby on a pillow on your lap. Support your breast    Support and narrow your breast with one hand using a \"U hold. \" Your thumb will be on the outer side of your breast. Your fingers will be on the inner side. You can also use a \"C hold,\" with all your fingers below the nipple and your thumb above it. Position your baby    Your other arm is behind your baby's back, with your hand supporting the base of the baby's head. Point your fingers and thumb toward your baby's ears. Get baby to open mouth    Touch your baby's Upper lip with your nipple to get your baby's mouth to open. Wait until your baby opens up really wide, like a big yawn. Bring the baby quickly to your breast--not your breast to the baby. Guide your breast into your baby's mouth. Listen for sucking sounds    The nipple and a large part of the darker area around the nipple (areola) should be in the baby's mouth. The baby's lips should be flared out, not folded in. Listen for regular sucking and swallowing sounds while the baby is feeding. If you cannot see or hear swallowing, watch your baby's ears. They will wiggle slightly when the baby swallows. How to break the latch    If you need to take your baby off your breast (for example, to reposition), you will need to break the baby's latch on your nipple. To break your baby's latch, put one finger in the corner of your baby's mouth.   Push your finger between your baby's gums to gently break the latch. If you don't break the latch before you remove your baby, your nipples can become sore, cracked, or bruised. Where can you learn more? Go to https://chpepiceweb.[email or web address] org and sign in to your [employer] account. Enter H083 in the [facility] box to learn more about \"How to Breastfeed: Step by Step. \"     If you do not have an account, please click on the \"Sign Up Now\" link. Current as of: June 16, 2021               Content Version: 13.0  © 4684-4448 [employer]. Care instructions adapted under license by [facility] ([facility]). If you have questions about a medical condition or this instruction, always ask your healthcare professional. [address] any warranty or liability for your use of this information. Learning About Nipple Jan Valladaresman for Breastfeeding  What are they? Nipple shields are devices that can help with certain breastfeeding problems. A nipple shield looks like a little hat with a brim. Many have a cutout area on the brim to allow for more skin-to-skin contact. The crown of the hat fits over the nipple, and the brim lies over the dark area around the nipple (areola). Most nipple shields are made of a soft, thin, flexible silicone. Why are they used? Nipple shields may be helpful for babies who have trouble sucking, such as:  Premature infants, who may not be strong enough to breastfeed well. Full-term babies with latch-on problems. Premature babies may not have strong suction. They may not be able to latch on as well as full-term babies. Suction is important because it helps the baby get enough of the nipple in his or her mouth. Nipple shields can help premature babies get a better latch and get enough milk. Nipple shields may also be used when a mother has flat nipples or nipples that point in (inverted) instead of out. How do you know if you are using them the right way?   It's important to use a nipple shield correctly. If it isn't used the right way, it could hurt your nipples or your baby may not get enough milk. The following signs mean you are using the shield correctly: You feel your milk let down. You can see milk in the shield and hear your baby swallowing. Your baby is making a good latch. It isn't painful when your baby sucks. The shield isn't puckered or dented when your baby is latched. Your baby is gaining weight and has wet and dirty diapers. Getting help  Nipple shields look simple, but there are some tricks to using them. You can get help from a lactation consultant or another breastfeeding expert. Your hospital, doctor, or midwife can help you find an expert in your area. A lactation consultant can:  Fit you with a shield that's the right size and shape. Help you make sure that your baby is latching on and feeding properly. Advise you on how long to use nipple shields. Offer tips on how to wean your baby from the shields when the time comes. Follow-up care is a key part of your treatment and safety. Be sure to make and go to all appointments, and call your doctor if you are having problems. It's also a good idea to know your test results and keep a list of the medicines you take. Where can you learn more? Go to https://chmellisa.CFEngine. org and sign in to your [employer] account. Enter N100 in the Search Health Information box to learn more about \"Learning About Nipple Fritz Dadds for Breastfeeding. \"     If you do not have an account, please click on the \"Sign Up Now\" link. Current as of: October 8, 2020               Content Version: 12.9  © 9617-7065 Healthwise, Incorporated. Care instructions adapted under license by [facility]. If you have questions about a medical condition or this instruction, always ask your healthcare professional. [place] [address] any warranty or liability for your use of this information. Follow-up with your OB doctor in 1 week if  delivery  unless otherwise instructed. Call office for an appointment. For breastfeeding support, you can contact our lactation specialists at [phone] or [phone]    DIET  Eat a well balanced diet focusing on foods high in fiber and protein  Drink plenty of fluids especially water. To avoid constipation you may take a mild stool softener as recommended by your doctor or midwife. ACTIVITY  Gradually increase your activity. Resume exercise regimen only after advised by your doctor or midwife. Avoid lifting anything heavier than your baby or a gallon of milk for SIX weeks. Avoid driving until your doctor or midwife has given their approval.  Deyanira Norman slowly from a lying to sitting and then a standing position. Climb stairs one at a time. Use caution when carrying your baby up and down the stairs. No sexual activity for 6 weeks or until advised by your doctor - Nothing in vagina: intercourse, tampons, or douching. Be prepared to discuss family planning at your follow-up OB visit. You may feel tired or have a lack of energy. You may continue your prenatal vitamin to replenish nutrients post delivery. Nap when baby naps to catch up on sleep. May return to work or school in 6 weeks or as directed by OB. EMOTIONS  You may feed raygoza, sad, teary, & overwhelmed. Contact your OB provider if you feel you may be showing signs of postpartum depression, or have thoughts of harming yourself or your infant. If infant will not stop crying, contact another adult for help or place infant in their crib on their back and take a break. NEVER shake your infant. BLEEDING  Vaginal bleeding will decrease in amount over the next few weeks. You will notice that as your activity increases, your flow may increase.   This is your body's way of telling you, you need to take things easier and rest more often. Call your OB/ER if you are saturating more than one maxi pad in an hour. BREAST CARE  Take medications as recommended by your doctor or midwife for pain  If you develop a warm, red, tender area on your breast or develop a fever contact your OB provider. For breastfeeding moms:  If you become engorged, feeding may be more difficult or painful for 1-2 days. You may find it helpful to hand express some milk so that the infant can latch on more easily. While breastfeeding, continue to take your prenatal vitamins as directed by your doctor or midwife. Only take medications verified as safe for breastfeeding. For non-breastfeeding moms:  You may apply ice packs to your breasts over your bra for twenty minutes at a time for comfort. Avoid stimulation to your breasts, when showering allow the water to strike your back not your breasts. Wear a good fitting bra until your milk dries, such as a sports bra. INCISIONAL CARE / BLAISE CARE  Clean your incision in the shower with mild soap. After shower pat the incision area dry and leave open to air. If used, Steri-stipes should be removed by 2 weeks. If used, Magali Prabhakar should be removed by the OB in office by 1 week. If used/ordered, an abdominal binder may provide support for your incision. Use the blaise-bottle after toileting until bleeding stops. Cleanse your perineum from front to back  If used, stitches or internal clips will dissolve in 4-6 weeks. You may use a sitz bath or soak in a clean tub as needed for comfort. Kegel exercises will help restore bladder control. SWELLING  Keep your legs elevated when sitting or lying. When wearing stocking or socks, make sure they are not too tight. WHEN TO CALL THE DOCTOR  If you have a temp of 100.4 or more. If your bleeding has increased and you are saturating a pad in an hour. Your abdomen is tender to touch. You are passing blood clots bigger than the size of a lemon.   If you are experiencing extreme weakness or dizziness. If you are having flu-like symptoms such as achy muscles or joints. There is a foul smell or a green color to your vaginal bleeding. If you have pain that cannot be relieved. You have persistent burning with urination or frequency. Call if you have concerns about your well-being. You are unable to sleep, eat, or are having thoughts of harming yourself or your baby. You have swelling, bleeding, drainage, foul odor, redness, or warmth in/around your incision or stitches. You have a red, warm, tender area in you calf.

## 2022-08-31 NOTE — PROGRESS NOTES
POD #2    Patient:  Claire Jacobsen     Admit Date:  8/29/2022  5:06 AM  Medical Record Number:  [id number]   Today's Date: 8/31/2022    S: No complaints, doing well; tolerating diet: yes -general; ambulating well: yes -up in room and in halls; voiding without difficulty:  yes -has no urinary complaints; bm: denies; flatus: yes -normal; pain meds appropriate: yes -Roxicodone and ibuprofen 600 mg; vaginal bleeding: Less than a period.     O:   Vitals:    08/30/22 2318 08/30/22 2332 08/31/22 0229 08/31/22 0759   BP:  (!) 157/82 (!) 145/65 (!) 153/74   Pulse:  79 73 76   Resp: 16 16 16 18   Temp:  97.9 °F (36.6 °C) 97.5 °F (36.4 °C) 98.8 °F (37.1 °C)   TempSrc:  Oral Oral Oral   SpO2:  98% 96% 98%   Weight:       Height:         Gen: A&O, cooperative, pleasant   Heart: RRR   Lungs: CTA b/l   Abd; soft, NT, non distended, +BS  Back: no CVA or paraspinal tenderness   Ext: No clubbing, cyanosis, edema:trace , no cords palpable, no calf tenderness   Neuro: intact   Inc: clean, dry, intact with Mepilex, no staining  Uterus: firm, well contracted, nt   Brigid pad: staining only, thin lochia    Recent Results (from the past 72 hour(s))   CBC    Collection Time: 08/29/22  5:11 AM   Result Value Ref Range    WBC 9.2 4.5 - 11.5 E9/L    RBC 3.62 3.50 - 5.50 E12/L    Hemoglobin 11.3 (L) 11.5 - 15.5 g/dL    Hematocrit 33.1 (L) 34.0 - 48.0 %    MCV 91.4 80.0 - 99.9 fL    MCH 31.2 26.0 - 35.0 pg    MCHC 34.1 32.0 - 34.5 %    RDW 15.1 (H) 11.5 - 15.0 fL    Platelets 110 556 - 808 E9/L    MPV 11.5 7.0 - 12.0 fL   DRUG SCREEN MULTI URINE    Collection Time: 08/29/22  5:11 AM   Result Value Ref Range    Amphetamine Screen, Urine NOT DETECTED Negative <1000 ng/mL    Barbiturate Screen, Ur NOT DETECTED Negative < 200 ng/mL    Benzodiazepine Screen, Urine NOT DETECTED Negative < 200 ng/mL    Cannabinoid Scrn, Ur NOT DETECTED Negative < 50ng/mL    Cocaine Metabolite Screen, Urine NOT DETECTED Negative < 300 ng/mL    Opiate Scrn, Ur NOT DETECTED Negative < 300ng/mL    PCP Screen, Urine NOT DETECTED Negative < 25 ng/mL    Methadone Screen, Urine NOT DETECTED Negative <300 ng/mL    Oxycodone Urine NOT DETECTED Negative <100 ng/mL    FENTANYL SCREEN, URINE NOT DETECTED Negative <1 ng/mL    Drug Screen Comment: see below    TYPE AND SCREEN    Collection Time: 22  5:55 AM   Result Value Ref Range    ABO/Rh A POS     Antibody Screen NEG    CBC auto differential    Collection Time: 22  5:43 AM   Result Value Ref Range    WBC 10.5 4.5 - 11.5 E9/L    RBC 3.48 (L) 3.50 - 5.50 E12/L    Hemoglobin 10.5 (L) 11.5 - 15.5 g/dL    Hematocrit 32.4 (L) 34.0 - 48.0 %    MCV 93.1 80.0 - 99.9 fL    MCH 30.2 26.0 - 35.0 pg    MCHC 32.4 32.0 - 34.5 %    RDW 15.3 (H) 11.5 - 15.0 fL    Platelets 288 (L) 886 - 450 E9/L    MPV 11.6 7.0 - 12.0 fL    Neutrophils % 76.8 43.0 - 80.0 %    Immature Granulocytes % 1.0 0.0 - 5.0 %    Lymphocytes % 15.1 (L) 20.0 - 42.0 %    Monocytes % 6.2 2.0 - 12.0 %    Eosinophils % 0.7 0.0 - 6.0 %    Basophils % 0.2 0.0 - 2.0 %    Neutrophils Absolute 8.05 (H) 1.80 - 7.30 E9/L    Immature Granulocytes # 0.10 E9/L    Lymphocytes Absolute 1.58 1.50 - 4.00 E9/L    Monocytes Absolute 0.65 0.10 - 0.95 E9/L    Eosinophils Absolute 0.07 0.05 - 0.50 E9/L    Basophils Absolute 0.02 0.00 - 0.20 E9/L       A: 42 y.o. female [id number] at 39w0d  POD#2 S/P repeat low transverse  section   Postoperative anemia of acute blood loss superimposed on to iron deficiency anemia of pregnancy  Rubella equivocal  Patient Active Problem List   Diagnosis    Obesity in pregnancy, antepartum    PCOS (polycystic ovarian syndrome)    High risk pregnancy, multigravida of advanced maternal age in third trimester - on 81 mg ASA    Declines  (vaginal birth after ) trial    Obesity affecting pregnancy in third trimester    H/O spontaneous , currently pregnant, third trimester    Iron deficiency anemia of pregnancy -on FeSO4 325 twice daily    Previous  section complicating pregnancy    History of gestational diabetes in prior pregnancy, currently pregnant in third trimester    Adult BMI 40.0-44.9 kg/sq m (pregravid BMI 40)    Rubella non-immune status, antepartum (EQUIVOCAL)    39 weeks gestation of pregnancy     delivery, delivered, current hospitalization    Term birth of  male    Nuchal cord, delivered, current hospitalization    Postoperative anemia due to acute blood loss       P: Routine post-op care. Encourage advancements in diet and activity. Dulcolax suppository and/or 1-2-3 (or Fleets) enema PRN. Continue prenatal vitamins with iron daily. Continue Roxicodone and ibuprofen as needed for pain. Rubella vaccine recommended prior to discharge. Anticipate home tomorrow.     Fredis Montana MD FACOG  2022 10:56 AM

## 2022-08-31 NOTE — PLAN OF CARE
Problem: ABCDS Injury Assessment  Goal: Absence of physical injury  Outcome: Completed     Problem: Postpartum  Goal: Experiences normal postpartum course  Description:  Postpartum OB-Pregnancy care plan goal which identifies if the mother is experiencing a normal postpartum course  Outcome: Completed  Goal: Appropriate maternal -  bonding  Description:  Postpartum OB-Pregnancy care plan goal which identifies if the mother and  are bonding appropriately  Outcome: Completed  Goal: Establishment of infant feeding pattern  Description:  Postpartum OB-Pregnancy care plan goal which identifies if the mother is establishing a feeding pattern with their   Outcome: Completed  Goal: Incisions, wounds, or drain sites healing without S/S of infection  Outcome: Completed     Problem: Pain  Goal: Verbalizes/displays adequate comfort level or baseline comfort level  Outcome: Completed     Problem: Infection - Adult  Goal: Absence of infection at discharge  Outcome: Completed  Goal: Absence of infection during hospitalization  Outcome: Completed  Goal: Absence of fever/infection during anticipated neutropenic period  Outcome: Completed     Problem: Discharge Planning  Goal: Discharge to home or other facility with appropriate resources  Outcome: Completed

## 2022-09-01 VITALS
BODY MASS INDEX: 40.73 KG/M2 | WEIGHT: 275 LBS | RESPIRATION RATE: 16 BRPM | OXYGEN SATURATION: 98 % | HEART RATE: 69 BPM | DIASTOLIC BLOOD PRESSURE: 86 MMHG | TEMPERATURE: 98.5 F | SYSTOLIC BLOOD PRESSURE: 175 MMHG | HEIGHT: 69 IN

## 2022-09-01 PROBLEM — O09.899 RUBELLA NON-IMMUNE STATUS, ANTEPARTUM: Status: RESOLVED | Noted: 2022-08-06 | Resolved: 2022-09-01

## 2022-09-01 PROBLEM — O99.213 OBESITY AFFECTING PREGNANCY IN THIRD TRIMESTER: Status: RESOLVED | Noted: 2019-11-25 | Resolved: 2022-09-01

## 2022-09-01 PROBLEM — O09.293 H/O SPONTANEOUS ABORTION, CURRENTLY PREGNANT, THIRD TRIMESTER: Status: RESOLVED | Noted: 2019-11-25 | Resolved: 2022-09-01

## 2022-09-01 PROBLEM — Z86.32 HISTORY OF GESTATIONAL DIABETES IN PRIOR PREGNANCY, CURRENTLY PREGNANT IN THIRD TRIMESTER: Status: RESOLVED | Noted: 2022-08-06 | Resolved: 2022-09-01

## 2022-09-01 PROBLEM — O34.219 PREVIOUS CESAREAN SECTION COMPLICATING PREGNANCY: Status: RESOLVED | Noted: 2022-08-06 | Resolved: 2022-09-01

## 2022-09-01 PROBLEM — O09.293 HISTORY OF GESTATIONAL DIABETES IN PRIOR PREGNANCY, CURRENTLY PREGNANT IN THIRD TRIMESTER: Status: RESOLVED | Noted: 2022-08-06 | Resolved: 2022-09-01

## 2022-09-01 PROBLEM — Z28.39 RUBELLA NON-IMMUNE STATUS, ANTEPARTUM: Status: RESOLVED | Noted: 2022-08-06 | Resolved: 2022-09-01

## 2022-09-01 PROBLEM — O09.523 HIGH RISK PREGNANCY, MULTIGRAVIDA OF ADVANCED MATERNAL AGE IN THIRD TRIMESTER: Status: RESOLVED | Noted: 2019-11-25 | Resolved: 2022-09-01

## 2022-09-01 PROBLEM — Z3A.39 39 WEEKS GESTATION OF PREGNANCY: Status: RESOLVED | Noted: 2022-08-29 | Resolved: 2022-09-01

## 2022-09-01 PROBLEM — O34.219 DECLINES VBAC (VAGINAL BIRTH AFTER CESAREAN) TRIAL: Status: RESOLVED | Noted: 2019-11-25 | Resolved: 2022-09-01

## 2022-09-01 PROCEDURE — 6370000000 HC RX 637 (ALT 250 FOR IP): Performed by: OBSTETRICS & GYNECOLOGY

## 2022-09-01 RX ORDER — IBUPROFEN 600 MG/1
600 TABLET ORAL EVERY 6 HOURS PRN
Qty: 60 TABLET | Refills: 0 | Status: SHIPPED | OUTPATIENT
Start: 2022-09-01 | End: 2022-10-21

## 2022-09-01 RX ORDER — MODIFIED LANOLIN
1 OINTMENT (GRAM) TOPICAL
COMMUNITY
Start: 2022-09-01 | End: 2022-10-21

## 2022-09-01 RX ORDER — SIMETHICONE 80 MG
80 TABLET,CHEWABLE ORAL EVERY 6 HOURS PRN
Refills: 0 | COMMUNITY
Start: 2022-09-01 | End: 2022-10-21

## 2022-09-01 RX ORDER — OXYCODONE HYDROCHLORIDE 5 MG/1
5 TABLET ORAL EVERY 6 HOURS PRN
Qty: 20 TABLET | Refills: 0 | Status: ON HOLD | OUTPATIENT
Start: 2022-09-01 | End: 2022-09-07

## 2022-09-01 RX ADMIN — Medication 1 CAPSULE: at 11:06

## 2022-09-01 RX ADMIN — SIMETHICONE 80 MG: 80 TABLET, CHEWABLE ORAL at 11:00

## 2022-09-01 RX ADMIN — IBUPROFEN 600 MG: 600 TABLET ORAL at 13:05

## 2022-09-01 RX ADMIN — OXYCODONE 10 MG: 5 TABLET ORAL at 15:55

## 2022-09-01 RX ADMIN — OXYCODONE 10 MG: 5 TABLET ORAL at 11:01

## 2022-09-01 RX ADMIN — IBUPROFEN 600 MG: 600 TABLET ORAL at 03:24

## 2022-09-01 RX ADMIN — METFORMIN HYDROCHLORIDE 1 TABLET: 500 TABLET, EXTENDED RELEASE ORAL at 11:01

## 2022-09-01 RX ADMIN — DOCUSATE SODIUM 100 MG: 100 CAPSULE, LIQUID FILLED ORAL at 11:00

## 2022-09-01 RX ADMIN — Medication: at 11:00

## 2022-09-01 RX ADMIN — OXYCODONE 10 MG: 5 TABLET ORAL at 07:04

## 2022-09-01 ASSESSMENT — PAIN DESCRIPTION - DESCRIPTORS
DESCRIPTORS: DISCOMFORT
DESCRIPTORS: ACHING;DISCOMFORT;SORE
DESCRIPTORS: ACHING;DISCOMFORT;SORE
DESCRIPTORS: SORE
DESCRIPTORS: DISCOMFORT

## 2022-09-01 ASSESSMENT — PAIN DESCRIPTION - LOCATION
LOCATION: INCISION
LOCATION: INCISION
LOCATION: GENERALIZED
LOCATION: ABDOMEN;INCISION
LOCATION: ABDOMEN;INCISION

## 2022-09-01 ASSESSMENT — PAIN SCALES - GENERAL
PAINLEVEL_OUTOF10: 7
PAINLEVEL_OUTOF10: 5
PAINLEVEL_OUTOF10: 4
PAINLEVEL_OUTOF10: 7
PAINLEVEL_OUTOF10: 6

## 2022-09-01 ASSESSMENT — PAIN DESCRIPTION - ORIENTATION
ORIENTATION: LOWER

## 2022-09-01 ASSESSMENT — PAIN - FUNCTIONAL ASSESSMENT
PAIN_FUNCTIONAL_ASSESSMENT: ACTIVITIES ARE NOT PREVENTED

## 2022-09-01 NOTE — DISCHARGE INSTR - ACTIVITY
After Your Delivery Discharge Instructions    What to do after you leave the hospital:  Recommended diet: regular diet    Recommended activity: No driving for 2 weeks. No sex or tampon use for 6 weeks. No douching. No heavy lifting (greater than baby and carrier) for 6 weeks. Initially, avoid frequent ambulation with stairs - start slowly then increase as tolerated. You may return to work in 10 -8 weeks. Showers are fine - avoid bath tubs, hot tubs, swimming. Follow-up in 1 week for incision check/dressing removal and in 6 weeks for final postpartum visit. You may monitor your blood pressures at home - call if Systolic pressure is consistently higher than 040 or diastolic pressure higher than 110    Call the Physician with any of these  signs and symptoms:    Warning signs regarding incision:  \"Popping\" of stitches or staples  Foul smelling discharge or pus  More redness or streaks around surgical incision than before    Incision care:  Keep incision uncovered  No tub baths until OK'd by your Physician      After your delivery - signs and symptoms to watch for:  Fever - Oral temperature greater than 100.4 degrees Fahrenheit  Foul-smelling vaginal discharge  Headache unrelieved by \"pain meds\", visual changes  Severe pain in the right upper quadrant of the abdomen or under the ribcage   Difficulty urinating  Breasts reddened, hard, hot to the touch  Nipple discharge which is foul-smelling or contains pus  Increased pain at the site of the surgical incision  Difficulty breathing with or without chest pain  New calf pain especially if only on one side  Sudden, continuing increased vaginal bleeding (heavier than a period) with or without clots  Unrelieved feelings of:   Inability to cope  Sadness  Anxiety  Lack of interest in baby  Insomnia  Crying     What to do at home:  Resume activity gradually   Don't lift anything heavier than baby and carrier until OK'd by your Physician   No sex until OK'd by your Physician  Eat regular nutritious meals  Take pain medication as prescribed whenever you need them  To avoid/relieve constipation take stool softeners if advised   Increase fiber in your diet    Most importantly ENJOY your Baby!  - Dr. Kari Vaughan =)))    Please call immediately with Questions and Concerns - [phone] (Office) or [phone] (Answering Service) after hours and weekends. By signing below, I understand that if any emergent problems occur, once I leave the hospital, I am to dial #[phone] or go immediately to the nearest Emergency Room. For less emergent matters,  Dr. Caroline Juarez can be reached by calling his Office or  answering service at the above numbers. I understand and acknowledge receipt of the instructions indicated above.

## 2022-09-01 NOTE — FLOWSHEET NOTE
Dr Luciano Sherman notified of patients blood pressure recheck of 175/86 after pressure of 164/78. Patient medicated for pain and blood pressure retake was 134/63.  Dr. Livia Russo  states to recheck pressure again and let him know results

## 2022-09-01 NOTE — DISCHARGE SUMMARY
Department of Obstetrics and Gynecology  Labor and Delivery  Discharge Summary    Patient:  Mirta Holloway         Medical Record Number:  [id number]    Admit Date:  2022  5:06 AM    Discharge Date: 2022    Final Diagnosis:   Principal Problem (Resolved):    39 weeks gestation of pregnancy  Active Problems:    Term birth of  male    PCOS (polycystic ovarian syndrome)    Iron deficiency anemia of pregnancy -on FeSO4 325 twice daily    Adult BMI 40.0-44.9 kg/sq m (pregravid BMI 40)    Postoperative anemia due to acute blood loss  Resolved Problems:    High risk pregnancy, multigravida of advanced maternal age in third trimester - on 80 mg ASA    Declines  (vaginal birth after ) trial    Previous  section complicating pregnancy     delivery, delivered, current hospitalization    Nuchal cord, delivered, current hospitalization    Obesity affecting pregnancy in third trimester    H/O spontaneous , currently pregnant, third trimester    History of gestational diabetes in prior pregnancy, currently pregnant in third trimester    Rubella non-immune status, antepartum (EQUIVOCAL)    Vitamin D deficiency on 2000IU daily      Chief Complaint - Presenting Illness - Physical Findings:   S/P repeat low transverse  [U30.396]  Normal pregnancy, third trimester [Z34.93]    HPI/Indication:  The patient is a 42 y.o. W female [id number] at 39w0d. Patient presents with a  History of 2 prior  sections for a repeat low transverse  delivery. Her initial  section was in 2016 for nonreassuring fetal heart tracing (nuchal cord x3). The second was a repeat in 2019 as she had declined a TOLAC. After review of the risks, benefits and alternatives she is requesting a repeat low-transverse  delivery. She is having some intermittent cramping but denies contractions, leaking fluid and vaginal bleeding. She has no symptoms of UTI or PIH.   There is good fetal movement. Management options were reviewed, the risks of surgery were discussed, including, but not limited to that of anesthesia, infection, hemorrhage, transfusion, blood borne disease, bowel/bladder/ureteral/vascular injury, and any corrective surgeries (bladder, ureter, bowel, hysterectomy), uterine or cervical lacerations, injury to the baby, DVT,PE, pain and death. Questions were answered to both the patient's and FOB/family satisfaction and informed consent was obtained to proceed as planned with a repeat low-transverse  delivery.        Hospital Course:   Delivery Summary:  Date of Procedure: 2022     Pre-Op Diagnosis: High risk multigravid IUP at 44 weeks, previous  section, declines , iron deficiency anemia and pregnancy, obesity in pregnancy (pregravid BMI 40), PCOS, rubella equivocal history of spontaneous , history of gestational diabetes and prior pregnancy      Patient Active Problem List   Diagnosis    Obesity in pregnancy, antepartum    PCOS (polycystic ovarian syndrome)    High risk pregnancy, multigravida of advanced maternal age in third trimester - on 80 mg ASA    Declines  (vaginal birth after ) trial    Obesity affecting pregnancy in third trimester    H/O spontaneous , currently pregnant, third trimester    Iron deficiency anemia of pregnancy -on FeSO4 325 twice daily    Previous  section complicating pregnancy    History of gestational diabetes in prior pregnancy, currently pregnant in third trimester    Adult BMI 40.0-44.9 kg/sq m (pregravid BMI 40)    Rubella non-immune status, antepartum (EQUIVOCAL)    39 weeks gestation of pregnancy      Post-Op Diagnosis: Same, viable male infant, nuchal cord x1      Patient Active Problem List   Diagnosis    Obesity in pregnancy, antepartum    PCOS (polycystic ovarian syndrome)    High risk pregnancy, multigravida of advanced maternal age in third trimester - on 81 mg ASA    Declines  (vaginal birth after ) trial    Obesity affecting pregnancy in third trimester    H/O spontaneous , currently pregnant, third trimester    Iron deficiency anemia of pregnancy -on FeSO4 325 twice daily    Previous  section complicating pregnancy    History of gestational diabetes in prior pregnancy, currently pregnant in third trimester    Adult BMI 40.0-44.9 kg/sq m (pregravid BMI 40)    Rubella non-immune status, antepartum (EQUIVOCAL)    39 weeks gestation of pregnancy     delivery, delivered, current hospitalization    Term birth of  male    Nuchal cord, delivered, current hospitalization      Procedure(s): Repeat Low Transverse  Section Via Pfannenstiel Skin Incision     Surgeon(s): Alberto Rodriguez MD     Assistant: Butch Luo DO     Anesthesia: Spinal with Duramorph     Complications: None     Estimated Blood Loss (mL): 500     Urine Output (mL): 350, clear     Drains:        Urinary Catheter 22 (Active)   $ Urethral catheter insertion Inserted for procedure 22 0750   Catheter Indications Perioperative use for selected surgical procedures 22 0750   Site Assessment Pink 22 0750   Urine Color Yellow 22 0750   Urine Appearance Clear 22 0750   Collection Container Standard 22 0750      Intraoperative Medications: Ancef 3 gms at induction, IV pitocin drip post placenta    Findings:  Live Born 2022 at [address] AM  Sex: Male  Fetal Position:  Cephalic, left occiput anterior  Apgars:  1 minute:  9; 5 minute:  9  Weight:  7# 12oz, 3510 grams  Nuchal Cord: was present and reduced  Placenta: was delivered with gentle traction and was noted to be intact, whole, and that the umbilical cord had three vessels noted. Tubes, uterus, ovaries:  Normal, the lower uterine segment was thin but without evidence of dehiscence.      Specimens:   * No specimens in log *     Implants:  * No implants in log *      Condition: Infant stable, transfer to postanesthesia recovery then to [address]  Mother stable, transfer to post anesthesia recovery then to Maternity    The patient was transferred to the recovery room with her IV, Bell, and SCD's from OR in place, where she was given PO Roxicodone to supplement her Duramorph Spinal for pain management. Her IV antibiotics were continued for 24 hour coverage. On the first postoperative day her IV, IVF, IV medications, SCD's and Bell were discontinued. She was started on PO pain meds (Roxicodone and Motrin 600 mg). She was encouraged to advance her diet and activities as tolerated. The patient had an unremarkable Hospital Course. Her care was advanced per routine protocol. Her vital signs were stable, she remained afebrile, and her physical exam was unremarkable throughout her hospitalization. She was subsequently discharged home on the third Hospital Day as she was tolerating her diet, ambulating well, voiding without difficulty and had appropriate flatus with a bowel movement.     Recent Results (from the past 72 hour(s))   CBC auto differential    Collection Time: 08/30/22  5:43 AM   Result Value Ref Range    WBC 10.5 4.5 - 11.5 E9/L    RBC 3.48 (L) 3.50 - 5.50 E12/L    Hemoglobin 10.5 (L) 11.5 - 15.5 g/dL    Hematocrit 32.4 (L) 34.0 - 48.0 %    MCV 93.1 80.0 - 99.9 fL    MCH 30.2 26.0 - 35.0 pg    MCHC 32.4 32.0 - 34.5 %    RDW 15.3 (H) 11.5 - 15.0 fL    Platelets 590 (L) 107 - 450 E9/L    MPV 11.6 7.0 - 12.0 fL    Neutrophils % 76.8 43.0 - 80.0 %    Immature Granulocytes % 1.0 0.0 - 5.0 %    Lymphocytes % 15.1 (L) 20.0 - 42.0 %    Monocytes % 6.2 2.0 - 12.0 %    Eosinophils % 0.7 0.0 - 6.0 %    Basophils % 0.2 0.0 - 2.0 %    Neutrophils Absolute 8.05 (H) 1.80 - 7.30 E9/L    Immature Granulocytes # 0.10 E9/L    Lymphocytes Absolute 1.58 1.50 - 4.00 E9/L    Monocytes Absolute 0.65 0.10 - 0.95 E9/L    Eosinophils Absolute 0.07 0.05 - 0.50 E9/L    Basophils Absolute 0. 02 0.00 - 0.20 E9/L         Physicians Following Patient During Hospitalization - Reason For Care:  Admitting Physician: Maria D Hunt  Operating Physician: Aubrie Bellamy Physician(s):    POD#1 Karisanos   POD#2 Maria D Hunt   POD#3 Maria D Hunt  Discharging Physician: Maria D Hunt  Consultant(s): n/a    Discharge Condition:  Level of Function:  Stable  Caregiver Arrangements and Educational Efforts: Written Discharge Instructions were verbally reviewed and given to the Patient. Special Problems: Monitor for HA, visual changes, midepigastric/RUQ pain and call PRN. May check BP at home call for systolic ID>868, diastolic > 958. Plans For Continuing Care:  Unreported Test Results and Intended Follow-Up: 1 week(s) time. Instructions for Physical Activity: No driving for 2 week(s). No sex or tampon use for 6 weeks. No douching. No heavy lifting (greater than baby and carrier) for 6 weeks. Frequent ambulation with stairs - start slowly then increase as tolerated. Return to work in 10 -8 weeks. Showers are fine - avoid bath tubs, hot tubs, swimming. Instructions for Diet: Regular diet  Discharge Medications:  Current Discharge Medication List        START taking these medications    Details   ibuprofen (ADVIL;MOTRIN) 600 MG tablet Take 1 tablet by mouth every 6 hours as needed for Pain  Qty: 60 tablet, Refills: 0      lansinoh lanolin CREA ointment Apply 1 applicator topically every hour as needed for Dry Skin (nipple discomfort)      oxyCODONE (ROXICODONE) 5 MG immediate release tablet Take 1 tablet by mouth every 6 hours as needed for Pain for up to 5 days.   Qty: 20 tablet, Refills: 0    Comments: Reduce doses taken as pain becomes manageable  Associated Diagnoses: Postoperative pain;  delivery delivered      simethicone (MYLICON) 80 MG chewable tablet Take 1 tablet by mouth every 6 hours as needed for Flatulence  Refills: 0           CONTINUE these medications which have NOT CHANGED    Details   vitamin D3 (CHOLECALCIFEROL) 25 MCG (1000 UT) TABS tablet Take 2 tablets by mouth daily  Qty: 30 tablet, Refills: 3      Probiotic Product (PROBIOTIC-10 PO) Take by mouth      Multiple Vitamin (MULTIVITAMIN ADULT PO) Take by mouth           STOP taking these medications       ferrous sulfate (IRON 325) 325 (65 Fe) MG tablet Comments:   Reason for Stopping:         nystatin (MYCOSTATIN) 430416 UNIT/GM powder Comments:   Reason for Stopping:         aspirin 81 MG chewable tablet Comments:   Reason for Stopping:         docusate sodium (COLACE) 100 MG capsule Comments:   Reason for Stopping: Follow-Up Visit Plan: In 1 week for incision check and in 6-8  weeks for final postpartum visit. Disposition: Home    Time Spent on Discharge:  30 minutes were spent in patient examination, evaluation, counseling as well as medication reconciliation, prescriptions for required medications, discharge plan and follow up.       Jazmín Arthur MD MD,FACOG    9/1/2022 9:23 AM

## 2022-09-01 NOTE — PROGRESS NOTES
CLINICAL PHARMACY NOTE: MEDS TO BEDS    Total # of Prescriptions Filled: 2   The following medications were delivered to the patient:   mg  Oxycodone 5 mg    Additional Documentation:

## 2022-09-01 NOTE — PROGRESS NOTES
0.10 - 0.95 E9/L    Eosinophils Absolute 0.07 0.05 - 0.50 E9/L    Basophils Absolute 0.02 0.00 - 0.20 E9/L       A: 42 y.o. female [id number] at 39w0d  POD#3 S/P repeat low transverse  section  Postoperative anemia of acute blood loss superimposed on to iron deficiency anemia of pregnancy  Rubella equivocal  Postpartum BP elevations - asymptomatic - not in parameters for treatment. Patient Active Problem List   Diagnosis    PCOS (polycystic ovarian syndrome)    High risk pregnancy, multigravida of advanced maternal age in third trimester - on 80 mg ASA    Declines  (vaginal birth after ) trial    Obesity affecting pregnancy in third trimester    H/O spontaneous , currently pregnant, third trimester    Iron deficiency anemia of pregnancy -on FeSO4 325 twice daily    Previous  section complicating pregnancy    History of gestational diabetes in prior pregnancy, currently pregnant in third trimester    Adult BMI 40.0-44.9 kg/sq m (pregravid BMI 40)    Rubella non-immune status, antepartum (EQUIVOCAL)    39 weeks gestation of pregnancy     delivery, delivered, current hospitalization    Term birth of  male    Nuchal cord, delivered, current hospitalization    Postoperative anemia due to acute blood loss       P: Routine post-op care. Discharge home with instructions, precautions. Prescriptions for Roxicodone and ibuprofen 600 mg. May use over-the-counter Tylenol as needed  May continue over-the-counter Simethicone and Colace PRN. Continue PNV with Iron. Monitor for HA, visual changes, midepigastric/RUQ pain and call PRN. May check BP at home call for systolic PY>338, diastolic > 808. Follow-up office 1 week for incision check, and 6-8 weeks for final post-op visit.     Phuc Villanueva MD FACOG  2022 8:17 AM

## 2022-09-01 NOTE — DISCHARGE INSTR - DIET

## 2022-09-05 ENCOUNTER — HOSPITAL ENCOUNTER (INPATIENT)
Age: 37
LOS: 2 days | Discharge: HOME | DRG: 776 | End: 2022-09-07
Attending: OBSTETRICS & GYNECOLOGY | Admitting: STUDENT IN AN ORGANIZED HEALTH CARE EDUCATION/TRAINING PROGRAM

## 2022-09-05 DIAGNOSIS — G89.18 POSTOPERATIVE PAIN: ICD-10-CM

## 2022-09-05 PROBLEM — Z34.90 NORMAL PREGNANCY, UNSPECIFIED TRIMESTER: Status: ACTIVE | Noted: 2022-09-05

## 2022-09-05 LAB
ALBUMIN SERPL-MCNC: 3.4 G/DL (ref 3.5–5.2)
ALP BLD-CCNC: 131 U/L (ref 35–104)
ALT SERPL-CCNC: 109 U/L (ref 0–32)
ANION GAP SERPL CALCULATED.3IONS-SCNC: 11 MMOL/L (ref 7–16)
AST SERPL-CCNC: 114 U/L (ref 0–31)
BACTERIA: ABNORMAL /HPF
BILIRUB SERPL-MCNC: 0.3 MG/DL (ref 0–1.2)
BILIRUBIN URINE: NEGATIVE
BLOOD, URINE: ABNORMAL
BUN BLDV-MCNC: 12 MG/DL (ref 6–20)
CALCIUM SERPL-MCNC: 10 MG/DL (ref 8.6–10.2)
CHLORIDE BLD-SCNC: 106 MMOL/L (ref 98–107)
CLARITY: CLEAR
CO2: 22 MMOL/L (ref 22–29)
COLOR: ABNORMAL
CREAT SERPL-MCNC: 0.6 MG/DL (ref 0.5–1)
CREATININE URINE: 28 MG/DL (ref 29–226)
GFR AFRICAN AMERICAN: >60
GFR NON-AFRICAN AMERICAN: >60 ML/MIN/1.73
GLUCOSE BLD-MCNC: 88 MG/DL (ref 74–99)
GLUCOSE URINE: NEGATIVE MG/DL
HCT VFR BLD CALC: 38.4 % (ref 34–48)
HEMOGLOBIN: 12.8 G/DL (ref 11.5–15.5)
KETONES, URINE: NEGATIVE MG/DL
LEUKOCYTE ESTERASE, URINE: NEGATIVE
MCH RBC QN AUTO: 30.8 PG (ref 26–35)
MCHC RBC AUTO-ENTMCNC: 33.3 % (ref 32–34.5)
MCV RBC AUTO: 92.3 FL (ref 80–99.9)
NITRITE, URINE: NEGATIVE
PDW BLD-RTO: 14.1 FL (ref 11.5–15)
PH UA: 7 (ref 5–9)
PLATELET # BLD: 182 E9/L (ref 130–450)
PMV BLD AUTO: 10 FL (ref 7–12)
POTASSIUM SERPL-SCNC: 4 MMOL/L (ref 3.5–5)
PROTEIN PROTEIN: 6 MG/DL (ref 0–12)
PROTEIN UA: NEGATIVE MG/DL
PROTEIN/CREAT RATIO: 0.2
PROTEIN/CREAT RATIO: 0.2 (ref 0–0.2)
RBC # BLD: 4.16 E12/L (ref 3.5–5.5)
RBC UA: ABNORMAL /HPF (ref 0–2)
SODIUM BLD-SCNC: 139 MMOL/L (ref 132–146)
SPECIFIC GRAVITY UA: <=1.005 (ref 1–1.03)
TOTAL PROTEIN: 6.4 G/DL (ref 6.4–8.3)
UROBILINOGEN, URINE: 0.2 E.U./DL
WBC # BLD: 7.3 E9/L (ref 4.5–11.5)
WBC UA: ABNORMAL /HPF (ref 0–5)

## 2022-09-05 PROCEDURE — 1220000000 HC SEMI PRIVATE OB R&B

## 2022-09-05 PROCEDURE — 6360000002 HC RX W HCPCS: Performed by: STUDENT IN AN ORGANIZED HEALTH CARE EDUCATION/TRAINING PROGRAM

## 2022-09-05 PROCEDURE — 84156 ASSAY OF PROTEIN URINE: CPT

## 2022-09-05 PROCEDURE — 85027 COMPLETE CBC AUTOMATED: CPT

## 2022-09-05 PROCEDURE — 36415 COLL VENOUS BLD VENIPUNCTURE: CPT

## 2022-09-05 PROCEDURE — 80053 COMPREHEN METABOLIC PANEL: CPT

## 2022-09-05 PROCEDURE — 99222 1ST HOSP IP/OBS MODERATE 55: CPT | Performed by: STUDENT IN AN ORGANIZED HEALTH CARE EDUCATION/TRAINING PROGRAM

## 2022-09-05 PROCEDURE — 2500000003 HC RX 250 WO HCPCS: Performed by: STUDENT IN AN ORGANIZED HEALTH CARE EDUCATION/TRAINING PROGRAM

## 2022-09-05 PROCEDURE — 82570 ASSAY OF URINE CREATININE: CPT

## 2022-09-05 PROCEDURE — 81001 URINALYSIS AUTO W/SCOPE: CPT

## 2022-09-05 PROCEDURE — 6370000000 HC RX 637 (ALT 250 FOR IP): Performed by: OBSTETRICS & GYNECOLOGY

## 2022-09-05 PROCEDURE — 6360000002 HC RX W HCPCS: Performed by: OBSTETRICS & GYNECOLOGY

## 2022-09-05 PROCEDURE — 84550 ASSAY OF BLOOD/URIC ACID: CPT

## 2022-09-05 RX ORDER — SODIUM CHLORIDE 0.9 % (FLUSH) 0.9 %
5-40 SYRINGE (ML) INJECTION EVERY 12 HOURS SCHEDULED
Status: DISCONTINUED | OUTPATIENT
Start: 2022-09-05 | End: 2022-09-07 | Stop reason: HOSPADM

## 2022-09-05 RX ORDER — LABETALOL HYDROCHLORIDE 5 MG/ML
20 INJECTION, SOLUTION INTRAVENOUS
Status: COMPLETED | OUTPATIENT
Start: 2022-09-05 | End: 2022-09-05

## 2022-09-05 RX ORDER — OXYCODONE HYDROCHLORIDE AND ACETAMINOPHEN 5; 325 MG/1; MG/1
1 TABLET ORAL EVERY 4 HOURS PRN
Status: DISCONTINUED | OUTPATIENT
Start: 2022-09-05 | End: 2022-09-07 | Stop reason: HOSPADM

## 2022-09-05 RX ORDER — SODIUM CHLORIDE 0.9 % (FLUSH) 0.9 %
5-40 SYRINGE (ML) INJECTION PRN
Status: DISCONTINUED | OUTPATIENT
Start: 2022-09-05 | End: 2022-09-07 | Stop reason: HOSPADM

## 2022-09-05 RX ORDER — LABETALOL HYDROCHLORIDE 5 MG/ML
80 INJECTION, SOLUTION INTRAVENOUS
Status: COMPLETED | OUTPATIENT
Start: 2022-09-05 | End: 2022-09-05

## 2022-09-05 RX ORDER — IBUPROFEN 800 MG/1
800 TABLET ORAL EVERY 6 HOURS PRN
Status: DISCONTINUED | OUTPATIENT
Start: 2022-09-05 | End: 2022-09-07 | Stop reason: HOSPADM

## 2022-09-05 RX ORDER — LABETALOL HYDROCHLORIDE 5 MG/ML
40 INJECTION, SOLUTION INTRAVENOUS
Status: COMPLETED | OUTPATIENT
Start: 2022-09-05 | End: 2022-09-05

## 2022-09-05 RX ORDER — HYDRALAZINE HYDROCHLORIDE 20 MG/ML
10 INJECTION INTRAMUSCULAR; INTRAVENOUS
Status: ACTIVE | OUTPATIENT
Start: 2022-09-05 | End: 2022-09-05

## 2022-09-05 RX ORDER — MAGNESIUM SULFATE HEPTAHYDRATE 40 MG/ML
4000 INJECTION, SOLUTION INTRAVENOUS ONCE
Status: COMPLETED | OUTPATIENT
Start: 2022-09-05 | End: 2022-09-05

## 2022-09-05 RX ORDER — SODIUM CHLORIDE 9 MG/ML
INJECTION, SOLUTION INTRAVENOUS PRN
Status: DISCONTINUED | OUTPATIENT
Start: 2022-09-05 | End: 2022-09-07 | Stop reason: HOSPADM

## 2022-09-05 RX ORDER — SODIUM CHLORIDE, SODIUM LACTATE, POTASSIUM CHLORIDE, CALCIUM CHLORIDE 600; 310; 30; 20 MG/100ML; MG/100ML; MG/100ML; MG/100ML
INJECTION, SOLUTION INTRAVENOUS CONTINUOUS
Status: DISCONTINUED | OUTPATIENT
Start: 2022-09-05 | End: 2022-09-07 | Stop reason: HOSPADM

## 2022-09-05 RX ORDER — OXYCODONE HYDROCHLORIDE AND ACETAMINOPHEN 5; 325 MG/1; MG/1
2 TABLET ORAL EVERY 4 HOURS PRN
Status: DISCONTINUED | OUTPATIENT
Start: 2022-09-05 | End: 2022-09-07 | Stop reason: HOSPADM

## 2022-09-05 RX ADMIN — MAGNESIUM SULFATE HEPTAHYDRATE 4000 MG: 40 INJECTION, SOLUTION INTRAVENOUS at 23:23

## 2022-09-05 RX ADMIN — LABETALOL HYDROCHLORIDE 20 MG: 5 INJECTION INTRAVENOUS at 23:06

## 2022-09-05 RX ADMIN — MAGNESIUM SULFATE HEPTAHYDRATE 2000 MG/HR: 40 INJECTION, SOLUTION INTRAVENOUS at 23:54

## 2022-09-05 RX ADMIN — LABETALOL HYDROCHLORIDE 80 MG: 5 INJECTION INTRAVENOUS at 23:39

## 2022-09-05 RX ADMIN — LABETALOL HYDROCHLORIDE 40 MG: 5 INJECTION INTRAVENOUS at 23:23

## 2022-09-05 RX ADMIN — OXYCODONE AND ACETAMINOPHEN 1 TABLET: 5; 325 TABLET ORAL at 21:46

## 2022-09-05 ASSESSMENT — PAIN SCALES - GENERAL: PAINLEVEL_OUTOF10: 7

## 2022-09-06 PROBLEM — R51.9 POSTPARTUM HEADACHE: Status: ACTIVE | Noted: 2022-09-05

## 2022-09-06 PROBLEM — D50.9 IRON DEFICIENCY ANEMIA OF PREGNANCY: Status: RESOLVED | Noted: 2019-11-25 | Resolved: 2022-09-06

## 2022-09-06 PROBLEM — R74.8 ELEVATED LIVER ENZYMES: Status: ACTIVE | Noted: 2022-09-06

## 2022-09-06 PROBLEM — O99.019 IRON DEFICIENCY ANEMIA OF PREGNANCY: Status: RESOLVED | Noted: 2019-11-25 | Resolved: 2022-09-06

## 2022-09-06 PROBLEM — D62 POSTOPERATIVE ANEMIA DUE TO ACUTE BLOOD LOSS: Status: RESOLVED | Noted: 2022-08-30 | Resolved: 2022-09-06

## 2022-09-06 LAB
ALBUMIN SERPL-MCNC: 3.5 G/DL (ref 3.5–5.2)
ALP BLD-CCNC: 118 U/L (ref 35–104)
ALT SERPL-CCNC: 98 U/L (ref 0–32)
ANION GAP SERPL CALCULATED.3IONS-SCNC: 10 MMOL/L (ref 7–16)
AST SERPL-CCNC: 104 U/L (ref 0–31)
BILIRUB SERPL-MCNC: 0.3 MG/DL (ref 0–1.2)
BUN BLDV-MCNC: 9 MG/DL (ref 6–20)
CALCIUM SERPL-MCNC: 9 MG/DL (ref 8.6–10.2)
CHLORIDE BLD-SCNC: 103 MMOL/L (ref 98–107)
CO2: 22 MMOL/L (ref 22–29)
CREAT SERPL-MCNC: 0.5 MG/DL (ref 0.5–1)
GFR AFRICAN AMERICAN: >60
GFR NON-AFRICAN AMERICAN: >60 ML/MIN/1.73
GLUCOSE BLD-MCNC: 93 MG/DL (ref 74–99)
POTASSIUM SERPL-SCNC: 4 MMOL/L (ref 3.5–5)
SODIUM BLD-SCNC: 135 MMOL/L (ref 132–146)
TOTAL PROTEIN: 6.4 G/DL (ref 6.4–8.3)
URIC ACID, SERUM: 5.7 MG/DL (ref 2.4–5.7)

## 2022-09-06 PROCEDURE — 80053 COMPREHEN METABOLIC PANEL: CPT

## 2022-09-06 PROCEDURE — 6360000002 HC RX W HCPCS: Performed by: OBSTETRICS & GYNECOLOGY

## 2022-09-06 PROCEDURE — 6370000000 HC RX 637 (ALT 250 FOR IP): Performed by: STUDENT IN AN ORGANIZED HEALTH CARE EDUCATION/TRAINING PROGRAM

## 2022-09-06 PROCEDURE — 6370000000 HC RX 637 (ALT 250 FOR IP): Performed by: OBSTETRICS & GYNECOLOGY

## 2022-09-06 PROCEDURE — 36415 COLL VENOUS BLD VENIPUNCTURE: CPT

## 2022-09-06 PROCEDURE — 1220000000 HC SEMI PRIVATE OB R&B

## 2022-09-06 RX ORDER — CAFFEINE 200 MG
100 TABLET ORAL ONCE
Status: COMPLETED | OUTPATIENT
Start: 2022-09-06 | End: 2022-09-06

## 2022-09-06 RX ORDER — NIFEDIPINE 30 MG/1
60 TABLET, FILM COATED, EXTENDED RELEASE ORAL DAILY
Status: DISCONTINUED | OUTPATIENT
Start: 2022-09-06 | End: 2022-09-07 | Stop reason: HOSPADM

## 2022-09-06 RX ADMIN — OXYCODONE AND ACETAMINOPHEN 1 TABLET: 5; 325 TABLET ORAL at 02:52

## 2022-09-06 RX ADMIN — OXYCODONE AND ACETAMINOPHEN 2 TABLET: 5; 325 TABLET ORAL at 16:59

## 2022-09-06 RX ADMIN — MAGNESIUM SULFATE HEPTAHYDRATE 2000 MG/HR: 40 INJECTION, SOLUTION INTRAVENOUS at 17:46

## 2022-09-06 RX ADMIN — IBUPROFEN 800 MG: 800 TABLET, FILM COATED ORAL at 15:12

## 2022-09-06 RX ADMIN — OXYCODONE AND ACETAMINOPHEN 1 TABLET: 5; 325 TABLET ORAL at 12:28

## 2022-09-06 RX ADMIN — OXYCODONE AND ACETAMINOPHEN 2 TABLET: 5; 325 TABLET ORAL at 07:52

## 2022-09-06 RX ADMIN — CAFFEINE 100 MG: 200 TABLET ORAL at 20:45

## 2022-09-06 RX ADMIN — NIFEDIPINE 60 MG: 30 TABLET, EXTENDED RELEASE ORAL at 15:09

## 2022-09-06 RX ADMIN — MAGNESIUM SULFATE HEPTAHYDRATE 2000 MG/HR: 40 INJECTION, SOLUTION INTRAVENOUS at 07:48

## 2022-09-06 ASSESSMENT — PAIN SCALES - GENERAL
PAINLEVEL_OUTOF10: 5
PAINLEVEL_OUTOF10: 7
PAINLEVEL_OUTOF10: 5
PAINLEVEL_OUTOF10: 5
PAINLEVEL_OUTOF10: 7

## 2022-09-06 ASSESSMENT — PAIN DESCRIPTION - DESCRIPTORS
DESCRIPTORS: ACHING
DESCRIPTORS: ACHING;PRESSURE

## 2022-09-06 ASSESSMENT — PAIN DESCRIPTION - LOCATION
LOCATION: HEAD
LOCATION: HEAD;INCISION

## 2022-09-06 ASSESSMENT — PAIN - FUNCTIONAL ASSESSMENT
PAIN_FUNCTIONAL_ASSESSMENT: ACTIVITIES ARE NOT PREVENTED

## 2022-09-06 ASSESSMENT — PAIN DESCRIPTION - RADICULAR PAIN: RADICULAR_PAIN: ABSENT

## 2022-09-06 NOTE — H&P
Obstetric History and Physical       CHIEF COMPLAINT:  elevated blood pressure  Patient was discharged on 22 after a repeat C/S was performed. She had elevated Bps but not high enough to warrant treatment. She was advised to take her blood pressure at home and to call if it was >160/110. Today, she didn't feel well all day and had a HA. It did not go away with motrin. She took her BP and it was 180/100  She denies blurry vision, tinnitis, RUQ pain, epigastric pain, SOB, fever, chills  She is breastfeeding   She has incisional pain  She has normal bleeding    HISTORY OF PRESENT ILLNESS:      Rolo Dallas is a 42 y.o., [id number], female who presents at Unknown with an EDC of Estimated Date of Delivery: None noted. .  OB History          4    Para   3    Term   3            AB   1    Living   3         SAB   1    IAB        Ectopic        Molar        Multiple   0    Live Births   3            Patient presents with a chief complaint as above and is being admitted for pre-eclampsia with severe features       PRENATAL CARE:    Complicated by: anemia    PAST OB HISTORY  OB History          4    Para   3    Term   3            AB   1    Living   3         SAB   1    IAB        Ectopic        Molar        Multiple   0    Live Births   3                Past Medical History:        Diagnosis Date    Anemia     Diet controlled gestational diabetes mellitus (GDM) in third trimester 2019    DUB (dysfunctional uterine bleeding) 2013    Endocervical polyp 2015    Irregular menses 2015    Preeclampsia in postpartum period 2022     Past Surgical History:        Procedure Laterality Date     SECTION       SECTION N/A 12/10/2019     SECTION performed by Zenaida De Jesus MD at [facility]     SECTION N/A 2022     SECTION performed by Zenaida De Jesus MD at [facility] OR     Allergies:   Other, Ketorolac, Ondansetron, Toradol [ketorolac tromethamine], and Zofran [ondansetron hcl]  Social History:    Social History     Socioeconomic History    Marital status:      Spouse name: Not on file    Number of children: Not on file    Years of education: Not on file    Highest education level: Not on file   Occupational History    Not on file   Tobacco Use    Smoking status: Former    Smokeless tobacco: Never   Substance and Sexual Activity    Alcohol use: No     Comment: occasonally    Drug use: No    Sexual activity: Yes     Partners: Male   Other Topics Concern    Not on file   Social History Narrative    Not on file     Social Determinants of Health     Financial Resource Strain: Not on file   Food Insecurity: Not on file   Transportation Needs: Not on file   Physical Activity: Not on file   Stress: Not on file   Social Connections: Not on file   Intimate Partner Violence: Not on file   Housing Stability: Not on file     Family History:       Problem Relation Age of Onset    Diabetes Mother     Diabetes Father     Heart Disease Father     Other Father      Medications Prior to Admission:  Medications Prior to Admission: ibuprofen (ADVIL;MOTRIN) 600 MG tablet, Take 1 tablet by mouth every 6 hours as needed for Pain  lansinoh lanolin CREA ointment, Apply 1 applicator topically every hour as needed for Dry Skin (nipple discomfort)  oxyCODONE (ROXICODONE) 5 MG immediate release tablet, Take 1 tablet by mouth every 6 hours as needed for Pain for up to 5 days.   simethicone (MYLICON) 80 MG chewable tablet, Take 1 tablet by mouth every 6 hours as needed for Flatulence  vitamin D3 (CHOLECALCIFEROL) 25 MCG (1000 UT) TABS tablet, Take 2 tablets by mouth daily  Probiotic Product (PROBIOTIC-10 PO), Take by mouth  Multiple Vitamin (MULTIVITAMIN ADULT PO), Take by mouth    REVIEW OF SYSTEMS:    CONSTITUTIONAL:  negative  RESPIRATORY:  negative  CARDIOVASCULAR:  negative  GASTROINTESTINAL:  negative  ALLERGIC/IMMUNOLOGIC: negative  NEUROLOGICAL:  + HA  BEHAVIOR/PSYCH:  negative    PHYSICAL EXAM:  Vitals:    09/05/22 2141 09/05/22 2156 09/05/22 2217 09/05/22 2248   BP: (!) 164/86 (!) 165/95 (!) 184/100 (!) 180/99   Pulse: 74 68 73 65     General appearance:  awake, alert, cooperative, no apparent distress, and appears stated age  Neurologic:  Awake, alert, oriented to name, place and time.     Lungs:  No increased work of breathing, good air exchange  Abdomen:  Soft, appropriately tender to palpation, dressing removed, incision CDI, uterus small  Extremities: reflexes +2/2, no edema    ASSESSMENT AND PLAN:  Preeclampsia with severe features based on severe range blood pressures and HA  PIH labs pending  Mag 4 g bolus and 2 g/hour  Breastpump  Pain meds  Repeat abnormal labs in AM  Discussed with Dr. Houston Robin

## 2022-09-06 NOTE — PROGRESS NOTES
HD#1POD #8 s/p readmission for severe preeclampsia    Patient:  Pj Kraft     Admit Date:  9/5/2022  8:57 PM  Medical Record Number:  [id number]   Today's Date: 9/6/2022    S: No complaints, doing well, still has a headache however seems a little better today, also some low backache at the spinal site; tolerating diet: yes -general; ambulating well: On bedrest, magnesium protocol; voiding without difficulty: Bell catheter in place as per magnesium protocol; bm: yes -this morning, normal; flatus: yes -normal; pain meds appropriate: yes -Percocet helps some, still has a headache; vaginal bleeding: Less than a period. O:   Vitals:    09/06/22 1456 09/06/22 1509 09/06/22 1510 09/06/22 1556   BP: 139/72 (!) 147/69 (!) 147/79 (!) 145/70   Pulse: 72  73 75   SpO2:           Intake/Output Summary (Last 24 hours) at 9/6/2022 1730  Last data filed at 9/6/2022 1556  Gross per 24 hour   Intake 285 ml   Output 2600 ml   Net -2315 ml        Gen: A&O, cooperative, pleasant   Heart: RRR   Lungs: CTA b/l   Abd; soft, NT, non distended, +BS  Back: no CVA or paraspinal tenderness, has some ecchymosis at the spinal site. No palpable hematoma.   Ext: No clubbing, cyanosis, edema:no , no cords palpable, no calf tenderness   Neuro: intact   Inc: clean, dry, intact  Uterus: firm, well contracted, nt   Brigid pad: staining only, thin lochia    Recent Results (from the past 72 hour(s))   CBC    Collection Time: 09/05/22 10:17 PM   Result Value Ref Range    WBC 7.3 4.5 - 11.5 E9/L    RBC 4.16 3.50 - 5.50 E12/L    Hemoglobin 12.8 11.5 - 15.5 g/dL    Hematocrit 38.4 34.0 - 48.0 %    MCV 92.3 80.0 - 99.9 fL    MCH 30.8 26.0 - 35.0 pg    MCHC 33.3 32.0 - 34.5 %    RDW 14.1 11.5 - 15.0 fL    Platelets 263 563 - 899 E9/L    MPV 10.0 7.0 - 12.0 fL   Comprehensive Metabolic Panel    Collection Time: 09/05/22 10:17 PM   Result Value Ref Range    Sodium 139 132 - 146 mmol/L    Potassium 4.0 3.5 - 5.0 mmol/L    Chloride 106 98 - 107 mmol/L    CO2 22 22 - 29 mmol/L    Anion Gap 11 7 - 16 mmol/L    Glucose 88 74 - 99 mg/dL    BUN 12 6 - 20 mg/dL    Creatinine 0.6 0.5 - 1.0 mg/dL    GFR Non-African American >60 >=60 mL/min/1.73    GFR African American >60     Calcium 10.0 8.6 - 10.2 mg/dL    Total Protein 6.4 6.4 - 8.3 g/dL    Albumin 3.4 (L) 3.5 - 5.2 g/dL    Total Bilirubin 0.3 0.0 - 1.2 mg/dL    Alkaline Phosphatase 131 (H) 35 - 104 U/L     (H) 0 - 32 U/L     (H) 0 - 31 U/L   Urinalysis    Collection Time: 09/05/22 10:17 PM   Result Value Ref Range    Color, UA Straw Straw/Yellow    Clarity, UA Clear Clear    Glucose, Ur Negative Negative mg/dL    Bilirubin Urine Negative Negative    Ketones, Urine Negative Negative mg/dL    Specific Gravity, UA <=1.005 1.005 - 1.030    Blood, Urine LARGE (A) Negative    pH, UA 7.0 5.0 - 9.0    Protein, UA Negative Negative mg/dL    Urobilinogen, Urine 0.2 <2.0 E.U./dL    Nitrite, Urine Negative Negative    Leukocyte Esterase, Urine Negative Negative   PROTEIN / CREATININE RATIO, URINE    Collection Time: 09/05/22 10:17 PM   Result Value Ref Range    Protein, Ur 6 0 - 12 mg/dL    Creatinine, Ur 28 (L) 29 - 226 mg/dL    Protein/Creat Ratio 0.2 0.0 - 0.2    Protein/Creat Ratio 0.2    Microscopic Urinalysis    Collection Time: 09/05/22 10:17 PM   Result Value Ref Range    WBC, UA NONE 0 - 5 /HPF    RBC, UA 5-10 (A) 0 - 2 /HPF    Bacteria, UA NONE SEEN None Seen /HPF   Uric Acid    Collection Time: 09/05/22 10:17 PM   Result Value Ref Range    Uric Acid, Serum 5.7 2.4 - 5.7 mg/dL   Comprehensive Metabolic Panel    Collection Time: 09/06/22  7:05 AM   Result Value Ref Range    Sodium 135 132 - 146 mmol/L    Potassium 4.0 3.5 - 5.0 mmol/L    Chloride 103 98 - 107 mmol/L    CO2 22 22 - 29 mmol/L    Anion Gap 10 7 - 16 mmol/L    Glucose 93 74 - 99 mg/dL    BUN 9 6 - 20 mg/dL    Creatinine 0.5 0.5 - 1.0 mg/dL    GFR Non-African American >60 >=60 mL/min/1.73    GFR African American >60     Calcium 9.0 8.6 - 10.2 mg/dL Total Protein 6.4 6.4 - 8.3 g/dL    Albumin 3.5 3.5 - 5.2 g/dL    Total Bilirubin 0.3 0.0 - 1.2 mg/dL    Alkaline Phosphatase 118 (H) 35 - 104 U/L    ALT 98 (H) 0 - 32 U/L     (H) 0 - 31 U/L       A: 42 y.o. female [id number] at Unknown  HD#1/POD#8 S/P repeat low transverse  section on 2022  Postpartum headache/severely elevated blood pressures readmitted for blood pressure control and magnesium sulfate for seizure prevention (status post labetalol 20, 40 and 80 mg on admission)  Elevated LFTs on admission, decreasing today  Patient Active Problem List   Diagnosis    PCOS (polycystic ovarian syndrome)    Adult BMI 40.0-44.9 kg/sq m (pregravid BMI 40)    Postpartum headache    Preeclampsia in postpartum period    Elevated liver enzymes       P: Complete magnesium sulfate this evening, may discontinue at 24 hours. Procardia XL 60 mg daily. First dose this afternoon. Caffeine 100 mg x 1 dose now for persistent headache. Encourage advancements in diet and activity (once magnesium sulfate has been discontinued. Cheikh Mulligan SCDs for DVT prophylaxis while in bed. Anticipate home tomorrow if headache resolves, LFTs continue to normalize and blood pressures improve.     Igor Mosley MD FACOG  2022 5:13 PM

## 2022-09-06 NOTE — PROGRESS NOTES
1 week PP patient presents for elevated BP at home with HA and back pain all day. States she was almost out of her pain medication at home and had not taken much today. States she had elevated blood pressure when admitted but she never needed treatment for it.  BP's cycling and orders received from Dr Rin Valdes

## 2022-09-06 NOTE — PROGRESS NOTES
Dr. Mark Christianson updated on patients vitals. Orders received for Procardia XL daily first dose now.

## 2022-09-07 VITALS
RESPIRATION RATE: 16 BRPM | HEART RATE: 64 BPM | OXYGEN SATURATION: 96 % | TEMPERATURE: 98.2 F | SYSTOLIC BLOOD PRESSURE: 140 MMHG | DIASTOLIC BLOOD PRESSURE: 71 MMHG

## 2022-09-07 PROBLEM — R51.9 POSTPARTUM HEADACHE: Status: RESOLVED | Noted: 2022-09-05 | Resolved: 2022-09-07

## 2022-09-07 PROBLEM — R74.8 ELEVATED LIVER ENZYMES: Status: RESOLVED | Noted: 2022-09-06 | Resolved: 2022-09-07

## 2022-09-07 LAB
ALBUMIN SERPL-MCNC: 3.5 G/DL (ref 3.5–5.2)
ALP BLD-CCNC: 111 U/L (ref 35–104)
ALT SERPL-CCNC: 85 U/L (ref 0–32)
AST SERPL-CCNC: 71 U/L (ref 0–31)
BILIRUB SERPL-MCNC: 0.2 MG/DL (ref 0–1.2)
BILIRUBIN DIRECT: <0.2 MG/DL (ref 0–0.3)
BILIRUBIN, INDIRECT: ABNORMAL MG/DL (ref 0–1)
TOTAL PROTEIN: 6.2 G/DL (ref 6.4–8.3)

## 2022-09-07 PROCEDURE — 6370000000 HC RX 637 (ALT 250 FOR IP): Performed by: STUDENT IN AN ORGANIZED HEALTH CARE EDUCATION/TRAINING PROGRAM

## 2022-09-07 PROCEDURE — 6370000000 HC RX 637 (ALT 250 FOR IP): Performed by: OBSTETRICS & GYNECOLOGY

## 2022-09-07 PROCEDURE — 80076 HEPATIC FUNCTION PANEL: CPT

## 2022-09-07 PROCEDURE — 36415 COLL VENOUS BLD VENIPUNCTURE: CPT

## 2022-09-07 PROCEDURE — 2580000003 HC RX 258: Performed by: STUDENT IN AN ORGANIZED HEALTH CARE EDUCATION/TRAINING PROGRAM

## 2022-09-07 RX ORDER — NIFEDIPINE 30 MG/1
60 TABLET, FILM COATED, EXTENDED RELEASE ORAL DAILY
Qty: 30 TABLET | Refills: 3 | Status: SHIPPED | OUTPATIENT
Start: 2022-09-08

## 2022-09-07 RX ORDER — OXYCODONE HYDROCHLORIDE 5 MG/1
5 TABLET ORAL EVERY 6 HOURS PRN
Qty: 12 TABLET | Refills: 0 | Status: SHIPPED | OUTPATIENT
Start: 2022-09-07 | End: 2022-09-10

## 2022-09-07 RX ADMIN — SODIUM CHLORIDE, PRESERVATIVE FREE 10 ML: 5 INJECTION INTRAVENOUS at 08:36

## 2022-09-07 RX ADMIN — OXYCODONE AND ACETAMINOPHEN 1 TABLET: 5; 325 TABLET ORAL at 04:56

## 2022-09-07 RX ADMIN — OXYCODONE AND ACETAMINOPHEN 1 TABLET: 5; 325 TABLET ORAL at 10:21

## 2022-09-07 RX ADMIN — OXYCODONE AND ACETAMINOPHEN 1 TABLET: 5; 325 TABLET ORAL at 14:00

## 2022-09-07 RX ADMIN — IBUPROFEN 800 MG: 800 TABLET, FILM COATED ORAL at 02:13

## 2022-09-07 RX ADMIN — NIFEDIPINE 60 MG: 30 TABLET, EXTENDED RELEASE ORAL at 08:34

## 2022-09-07 ASSESSMENT — PAIN SCALES - GENERAL
PAINLEVEL_OUTOF10: 4
PAINLEVEL_OUTOF10: 3
PAINLEVEL_OUTOF10: 4
PAINLEVEL_OUTOF10: 4

## 2022-09-07 ASSESSMENT — PAIN DESCRIPTION - ORIENTATION
ORIENTATION: LOWER
ORIENTATION: LOWER

## 2022-09-07 ASSESSMENT — PAIN DESCRIPTION - LOCATION
LOCATION: ABDOMEN;HEAD
LOCATION: ABDOMEN

## 2022-09-07 ASSESSMENT — PAIN DESCRIPTION - DESCRIPTORS
DESCRIPTORS: ACHING;DISCOMFORT
DESCRIPTORS: ACHING;SORE;TENDER
DESCRIPTORS: ACHING;SORE
DESCRIPTORS: SORE;TENDER

## 2022-09-07 NOTE — PROGRESS NOTES
Pt sitting up eating breakfast denies any c/o at this time. States percocet helped her headache and cramping earlier. Voiding without difficulty, call bell in reach.

## 2022-09-07 NOTE — PROGRESS NOTES
Pt up to bathroom after discontinuation of mag. Bell removed. Bath, farzad, and oral care preformed. Linens and undergarments changed. Pt tolerated ambulation well and returned to bed.

## 2022-09-07 NOTE — DISCHARGE INSTR - DIET

## 2022-09-07 NOTE — PROGRESS NOTES
CLINICAL PHARMACY NOTE: MEDS TO BEDS    Total # of Prescriptions Filled: 2   The following medications were delivered to the patient:  Oxycodone 5  Nifedipine er 30    Additional Documentation:

## 2022-09-07 NOTE — PROGRESS NOTES
HD#2POD #9 s/p readmission for severe preeclampsia    Patient:  Pro Holman     Admit Date:  9/5/2022  8:57 PM  Medical Record Number:  [id number]   Today's Date: 9/7/2022    S: No complaints, doing well, headache resolved with caffiene and mg++ d/c, also some still low backache at the spinal siteand inc discomfort - took a percocet this AM; tolerating diet: yes -general; ambulating well: On bedrest, magnesium protocol; voiding without difficulty: Bell catheter in place as per magnesium protocol; bm: yes -this morning, normal; flatus: yes -normal; pain meds appropriate: yes -Percocet helps some, still has a headache; vaginal bleeding: Less than a period. O:   Vitals:    09/07/22 0834 09/07/22 0836 09/07/22 1009 09/07/22 1021   BP: (!) 150/74 (!) 150/74 127/64    Pulse:  62 77    Resp:  16  16   Temp:  98.2 °F (36.8 °C)     TempSrc:       SpO2:           Intake/Output Summary (Last 24 hours) at 9/7/2022 1026  Last data filed at 9/7/2022 0836  Gross per 24 hour   Intake 10 ml   Output 5850 ml   Net -5840 ml        Gen: A&O, cooperative, pleasant   Heart: RRR   Lungs: CTA b/l   Abd; soft, NT, non distended, +BS  Back: no CVA or paraspinal tenderness, has some ecchymosis at the spinal site. No palpable hematoma.   Ext: No clubbing, cyanosis, edema:no , no cords palpable, no calf tenderness   Neuro: intact   Inc: clean, dry, intact  Uterus: firm, well contracted, nt   Brigid pad: staining only, thin lochia    Recent Results (from the past 72 hour(s))   CBC    Collection Time: 09/05/22 10:17 PM   Result Value Ref Range    WBC 7.3 4.5 - 11.5 E9/L    RBC 4.16 3.50 - 5.50 E12/L    Hemoglobin 12.8 11.5 - 15.5 g/dL    Hematocrit 38.4 34.0 - 48.0 %    MCV 92.3 80.0 - 99.9 fL    MCH 30.8 26.0 - 35.0 pg    MCHC 33.3 32.0 - 34.5 %    RDW 14.1 11.5 - 15.0 fL    Platelets 992 343 - 148 E9/L    MPV 10.0 7.0 - 12.0 fL   Comprehensive Metabolic Panel    Collection Time: 09/05/22 10:17 PM   Result Value Ref Range    Sodium 139 132 - 146 mmol/L    Potassium 4.0 3.5 - 5.0 mmol/L    Chloride 106 98 - 107 mmol/L    CO2 22 22 - 29 mmol/L    Anion Gap 11 7 - 16 mmol/L    Glucose 88 74 - 99 mg/dL    BUN 12 6 - 20 mg/dL    Creatinine 0.6 0.5 - 1.0 mg/dL    GFR Non-African American >60 >=60 mL/min/1.73    GFR African American >60     Calcium 10.0 8.6 - 10.2 mg/dL    Total Protein 6.4 6.4 - 8.3 g/dL    Albumin 3.4 (L) 3.5 - 5.2 g/dL    Total Bilirubin 0.3 0.0 - 1.2 mg/dL    Alkaline Phosphatase 131 (H) 35 - 104 U/L     (H) 0 - 32 U/L     (H) 0 - 31 U/L   Urinalysis    Collection Time: 09/05/22 10:17 PM   Result Value Ref Range    Color, UA Straw Straw/Yellow    Clarity, UA Clear Clear    Glucose, Ur Negative Negative mg/dL    Bilirubin Urine Negative Negative    Ketones, Urine Negative Negative mg/dL    Specific Gravity, UA <=1.005 1.005 - 1.030    Blood, Urine LARGE (A) Negative    pH, UA 7.0 5.0 - 9.0    Protein, UA Negative Negative mg/dL    Urobilinogen, Urine 0.2 <2.0 E.U./dL    Nitrite, Urine Negative Negative    Leukocyte Esterase, Urine Negative Negative   PROTEIN / CREATININE RATIO, URINE    Collection Time: 09/05/22 10:17 PM   Result Value Ref Range    Protein, Ur 6 0 - 12 mg/dL    Creatinine, Ur 28 (L) 29 - 226 mg/dL    Protein/Creat Ratio 0.2 0.0 - 0.2    Protein/Creat Ratio 0.2    Microscopic Urinalysis    Collection Time: 09/05/22 10:17 PM   Result Value Ref Range    WBC, UA NONE 0 - 5 /HPF    RBC, UA 5-10 (A) 0 - 2 /HPF    Bacteria, UA NONE SEEN None Seen /HPF   Uric Acid    Collection Time: 09/05/22 10:17 PM   Result Value Ref Range    Uric Acid, Serum 5.7 2.4 - 5.7 mg/dL   Comprehensive Metabolic Panel    Collection Time: 09/06/22  7:05 AM   Result Value Ref Range    Sodium 135 132 - 146 mmol/L    Potassium 4.0 3.5 - 5.0 mmol/L    Chloride 103 98 - 107 mmol/L    CO2 22 22 - 29 mmol/L    Anion Gap 10 7 - 16 mmol/L    Glucose 93 74 - 99 mg/dL    BUN 9 6 - 20 mg/dL    Creatinine 0.5 0.5 - 1.0 mg/dL    GFR Non-African American >60 >=60 mL/min/1.73    GFR African American >60     Calcium 9.0 8.6 - 10.2 mg/dL    Total Protein 6.4 6.4 - 8.3 g/dL    Albumin 3.5 3.5 - 5.2 g/dL    Total Bilirubin 0.3 0.0 - 1.2 mg/dL    Alkaline Phosphatase 118 (H) 35 - 104 U/L    ALT 98 (H) 0 - 32 U/L     (H) 0 - 31 U/L   Hepatic Function Panel    Collection Time: 22  6:00 AM   Result Value Ref Range    Total Protein 6.2 (L) 6.4 - 8.3 g/dL    Albumin 3.5 3.5 - 5.2 g/dL    Alkaline Phosphatase 111 (H) 35 - 104 U/L    ALT 85 (H) 0 - 32 U/L    AST 71 (H) 0 - 31 U/L    Total Bilirubin 0.2 0.0 - 1.2 mg/dL    Bilirubin, Direct <0.2 0.0 - 0.3 mg/dL    Bilirubin, Indirect see below 0.0 - 1.0 mg/dL       A: 42 y.o. female [id number] at Unknown  HD#2/POD#9 S/P repeat low transverse  section on 2022  Postpartum headache - resolved  Severely elevated blood pressures readmitted for blood pressure control and magnesium sulfate x 24 hours for seizure prevention (status post labetalol 20, 40 and 80 mg on admission) - improved on Procrdia XL 60 mg yesterday afternoon  Elevated LFTs on admission, normalizing over the last 2 days  Patient Active Problem List   Diagnosis    PCOS (polycystic ovarian syndrome)    Adult BMI 40.0-44.9 kg/sq m (pregravid BMI 40)    Postpartum headache    Preeclampsia in postpartum period    Elevated liver enzymes       P: Completed magnesium sulfate tyesterday evening  Procardia XL 60 mg daily. Rx given. Encourage advancements in diet and activity. Home today with instructions and precautions. Monitor for return of headache, visual changes, increased swelling, midepigastric/right upper quadrant pain. Continue ibuprofen, Tylenol and at home. Roxicodone Rx for 3 days. Follow-up blood pressure check in 1 week's time.     Le Harden MD FACOG  2022 10:26 AM

## 2022-09-07 NOTE — DISCHARGE SUMMARY
Discharge Summary    Date: 9/7/2022  Patient Name: Nadege Calderon    YOB: 1985     Age: 42 y.o. Admit Date: 9/5/2022  Discharge Date: 9/7/2022  Discharge Condition: Good    Admission Diagnosis  Normal pregnancy, unspecified trimester [Z34.90]; Preeclampsia in postpartum period [O14.95]      Discharge Diagnosis  Principal Problem (Resolved):    Postpartum headache  Active Problems:    Preeclampsia in postpartum period    Elevated liver enzymes      Hospital Stay  Narrative of Hospital Course:  Patient was discharged on 9/1/22 after a repeat C/S was performed. She had elevated Bps but not high enough to warrant treatment. She was advised to take her blood pressure at home and to call if it was >160/110. On postop day #7, 9/5/2022, she didn't feel well all day and had a HA. It did not go away with motrin. She took her BP and it was 180/100. She denied blurry vision, tinnitis, RUQ pain, epigastric pain, SOB, fever, chills. Admission blood pressures:  (!) 164/86 (!) 165/95 (!) 184/100 (!) 180/99  Abnormal admission labs:  9/5/22 22:17  ALT: 109 (H)  AST: 114 (H)  The patient received IV labetalol per protocol 20/40/80 milligram -refill of 140 mg. She was started on magnesium sulfate 24 hours. Her blood pressures improved. He was given Percocet for headache. On hospital day 1 she was started on Procardia XL 60 mg daily. And the magnesium sulfate was discontinued after 24 hours. Patient was given caffeine 100 mg for the headache. The headache resolved. Patient was subsequent discharged home on the second hospital day on Procardia XL 60 mg daily as her blood pressures were controlled and she was asymptomatic. .    Consultants:  None    Surgeries/procedures Performed:  IV labetalol protocol 20/40/80  IV magnesium sulfate x24 hours  Blood pressure management  Pain control.      Treatments:    Analgesia and Cardiac Medications    Acetaminophen w/ Codeine, Beta-Blocker and Calcium Channel Blocker    Discharge Plan/Disposition:  Home    Hospital/Incidental Findings Requiring Follow Up:    Patient Instructions:    Diet: Regular Diet    Activity:Activity as Tolerated  For number of days (if applicable): 42      Other Instructions: · What to do after you leave the hospital:  · Recommended diet: regular diet    · Recommended activity: No driving for 2 weeks. No sex or tampon use for 6 weeks. No douching. No heavy lifting (greater than baby and carrier) for 6 weeks. Initially, avoid frequent ambulation with stairs - start slowly then increase as tolerated. You may return to work in 10 -8 weeks. Showers are fine - avoid bath tubs, hot tubs, swimming. · Take blood pressure medication daily. Monitor for lightheadedness, dizziness, excessive fatigue. · Check blood pressures at home. If systolic pressure persistently greater than or equal to 920 or diastolic persistent greater than or equal to 110. Call also if blood pressures are less than 90 systolic or less than 60 diastolic and hold your blood pressure medication for that day. · Follow-up in 1 week for blood pressure check and in 6 weeks for final postpartum visit.     Call the Physician with any of these  signs and symptoms:    Warning signs regarding incision:  - \"Popping\" of stitches or staples  - Foul smelling discharge or pus  - More redness or streaks around surgical incision than before    Incision care:  - Keep incision uncovered  - No tub baths until OK'd by your Physician      After your delivery - signs and symptoms to watch for:  - Fever - Oral temperature greater than 100.4 degrees Fahrenheit  - Foul-smelling vaginal discharge  - Headache unrelieved by \"pain meds\"  - Difficulty urinating  - Breasts reddened, hard, hot to the touch  - Nipple discharge which is foul-smelling or contains pus  - Increased pain at the site of the surgical incision  - Difficulty breathing with or without chest pain  - New calf pain especially if only on one side  - Sudden, continuing increased vaginal bleeding (heavier than a period) with or without clots  - Unrelieved feelings of:  - Inability to cope  - Sadness  - Anxiety  - Lack of interest in baby  - Insomnia  - Crying     What to do at home:  - Resume activity gradually   - Don't lift anything heavier than baby and carrier until OK'd by your Physician   - No sex until OK'd by your Physician  - Eat regular nutritious meals  - Take pain medication as prescribed whenever you need them  - To avoid/relieve constipation take stool softeners if advised   - Increase fiber in your diet    Provider Follow-Up:   No follow-ups on file.      Significant Diagnostic Studies:    Recent Labs:  Admission on 09/05/2022  WBC                                           Date: 09/05/2022  Value: 7.3         Ref range: 4.5 - 11.5 E9/L    Status: Final  RBC                                           Date: 09/05/2022  Value: 4.16        Ref range: 3.50 - 5.50 E12/L  Status: Final  Hemoglobin                                    Date: 09/05/2022  Value: 12.8        Ref range: 11.5 - 15.5 g/dL   Status: Final  Hematocrit                                    Date: 09/05/2022  Value: 38.4        Ref range: 34.0 - 48.0 %      Status: Final  MCV                                           Date: 09/05/2022  Value: 92.3        Ref range: 80.0 - 99.9 fL     Status: Final  MCH                                           Date: 09/05/2022  Value: 30.8        Ref range: 26.0 - 35.0 pg     Status: Final  MCHC                                          Date: 09/05/2022  Value: 33.3        Ref range: 32.0 - 34.5 %      Status: Final  RDW                                           Date: 09/05/2022  Value: 14.1        Ref range: 11.5 - 15.0 fL     Status: Final  Platelets                                     Date: 09/05/2022  Value: 182         Ref range: 130 - 450 E9/L     Status: Final  MPV                                           Date: 09/05/2022  Value: 10.0        Ref range: 7.0 - 12.0 fL      Status: Final  Sodium                                        Date: 09/05/2022  Value: 139         Ref range: 132 - 146 mmol/L   Status: Final  Potassium                                     Date: 09/05/2022  Value: 4.0         Ref range: 3.5 - 5.0 mmol/L   Status: Final  Chloride                                      Date: 09/05/2022  Value: 106         Ref range: 98 - 107 mmol/L    Status: Final  CO2                                           Date: 09/05/2022  Value: 22          Ref range: 22 - 29 mmol/L     Status: Final  Anion Gap                                     Date: 09/05/2022  Value: 11          Ref range: 7 - 16 mmol/L      Status: Final  Glucose                                       Date: 09/05/2022  Value: 88          Ref range: 74 - 99 mg/dL      Status: Final  BUN                                           Date: 09/05/2022  Value: 12          Ref range: 6 - 20 mg/dL       Status: Final  Creatinine                                    Date: 09/05/2022  Value: 0.6         Ref range: 0.5 - 1.0 mg/dL    Status: Final  GFR Non-                      Date: 09/05/2022  Value: >60         Ref range: >=60 mL/min/1.73   Status: Final                Comment: Chronic Kidney Disease: less than 60 ml/min/1.73 sq.m. Kidney Failure: less than 15 ml/min/1.73 sq.m. Results valid for patients 18 years and older.     GFR                           Date: 09/05/2022  Value: >60           Status: Final  Calcium                                       Date: 09/05/2022  Value: 10.0        Ref range: 8.6 - 10.2 mg/dL   Status: Final  Total Protein                                 Date: 09/05/2022  Value: 6.4         Ref range: 6.4 - 8.3 g/dL     Status: Final  Albumin                                       Date: 09/05/2022  Value: 3.4 (A)     Ref range: 3.5 - 5.2 g/dL     Status: Final  Total Bilirubin                               Date: 09/05/2022  Value: 0.3         Ref range: 0.0 - 1.2 mg/dL    Status: Final  Alkaline Phosphatase                          Date: 09/05/2022  Value: 131 (A)     Ref range: 35 - 104 U/L       Status: Final  ALT                                           Date: 09/05/2022  Value: 109 (A)     Ref range: 0 - 32 U/L         Status: Final  AST                                           Date: 09/05/2022  Value: 114 (A)     Ref range: 0 - 31 U/L         Status: Final  Color, UA                                     Date: 09/05/2022  Value: Straw       Ref range: Straw/Yellow       Status: Final  Clarity, UA                                   Date: 09/05/2022  Value: Clear       Ref range: Clear              Status: Final  Glucose, Ur                                   Date: 09/05/2022  Value: Negative    Ref range: Negative mg/dL     Status: Final  Bilirubin Urine                               Date: 09/05/2022  Value: Negative    Ref range: Negative           Status: Final  Ketones, Urine                                Date: 09/05/2022  Value: Negative    Ref range: Negative mg/dL     Status: Final  Specific [place], UA                          Date: 09/05/2022  Value: <=1.005     Ref range: 1.005 - 1.030      Status: Final  Blood, Urine                                  Date: 09/05/2022  Value: LARGE (A)   Ref range: Negative           Status: Final  pH, UA                                        Date: 09/05/2022  Value: 7.0         Ref range: 5.0 - 9.0          Status: Final  Protein, UA                                   Date: 09/05/2022  Value: Negative    Ref range: Negative mg/dL     Status: Final  Urobilinogen, Urine                           Date: 09/05/2022  Value: 0.2         Ref range: <2.0 E.U./dL       Status: Final  Nitrite, Urine                                Date: 09/05/2022  Value: Negative    Ref range: Negative           Status: Final  Leukocyte Esterase, Urine                     Date: 09/05/2022  Value: Negative    Ref range: 22          Ref range: 22 - 29 mmol/L     Status: Final  Anion Gap                                     Date: 09/06/2022  Value: 10          Ref range: 7 - 16 mmol/L      Status: Final  Glucose                                       Date: 09/06/2022  Value: 93          Ref range: 74 - 99 mg/dL      Status: Final  BUN                                           Date: 09/06/2022  Value: 9           Ref range: 6 - 20 mg/dL       Status: Final  Creatinine                                    Date: 09/06/2022  Value: 0.5         Ref range: 0.5 - 1.0 mg/dL    Status: Final  GFR Non-                      Date: 09/06/2022  Value: >60         Ref range: >=60 mL/min/1.73   Status: Final                Comment: Chronic Kidney Disease: less than 60 ml/min/1.73 sq.m. Kidney Failure: less than 15 ml/min/1.73 sq.m. Results valid for patients 18 years and older.     GFR                           Date: 09/06/2022  Value: >60           Status: Final  Calcium                                       Date: 09/06/2022  Value: 9.0         Ref range: 8.6 - 10.2 mg/dL   Status: Final  Total Protein                                 Date: 09/06/2022  Value: 6.4         Ref range: 6.4 - 8.3 g/dL     Status: Final  Albumin                                       Date: 09/06/2022  Value: 3.5         Ref range: 3.5 - 5.2 g/dL     Status: Final  Total Bilirubin                               Date: 09/06/2022  Value: 0.3         Ref range: 0.0 - 1.2 mg/dL    Status: Final  Alkaline Phosphatase                          Date: 09/06/2022  Value: 118 (A)     Ref range: 35 - 104 U/L       Status: Final  ALT                                           Date: 09/06/2022  Value: 98 (A)      Ref range: 0 - 32 U/L         Status: Final  AST                                           Date: 09/06/2022  Value: 104 (A)     Ref range: 0 - 31 U/L         Status: Final  Total Protein                                 Date: 09/07/2022  Value: 6.2 (A)     Ref range: 6.4 - 8.3 g/dL     Status: Final  Albumin                                       Date: 2022  Value: 3.5         Ref range: 3.5 - 5.2 g/dL     Status: Final  Alkaline Phosphatase                          Date: 2022  Value: 111 (A)     Ref range: 35 - 104 U/L       Status: Final  ALT                                           Date: 2022  Value: 85 (A)      Ref range: 0 - 32 U/L         Status: Final  AST                                           Date: 2022  Value: 71 (A)      Ref range: 0 - 31 U/L         Status: Final  Total Bilirubin                               Date: 2022  Value: 0.2         Ref range: 0.0 - 1.2 mg/dL    Status: Final  Bilirubin, Direct                             Date: 2022  Value: <0.2        Ref range: 0.0 - 0.3 mg/dL    Status: Final  Bilirubin, Indirect                           Date: 2022  Value: see below   Ref range: 0.0 - 1.0 mg/dL    Status: Final                Comment: Indirect Bilirubin cannot be calculated since Total Bilirubin  and/or Direct Bilirubin is below measurable range.    ------------    Radiology last 7 days:  No results found. [unfilled]    Discharge Medications    Current Discharge Medication List    START taking these medications    NIFEdipine (ADALAT CC) 30 MG extended release tablet  Take 2 tablets by mouth daily  Qty: 30 tablet Refills: 3          Current Discharge Medication List    CONTINUE these medications which have CHANGED    oxyCODONE (ROXICODONE) 5 MG immediate release tablet  Take 1 tablet by mouth every 6 hours as needed for Pain for up to 3 days.   Qty: 12 tablet Refills: 0  Comments: Reduce doses taken as pain becomes manageable  Associated Diagnoses: delivery delivered; Postoperative pain          Current Discharge Medication List    CONTINUE these medications which have NOT CHANGED    ibuprofen (ADVIL;MOTRIN) 600 MG tablet  Take 1 tablet by mouth every 6 hours as needed for Pain  Qty: 60 tablet Refills: 0    lansinoh lanolin CREA ointment  Apply 1 applicator topically every hour as needed for Dry Skin (nipple discomfort)    simethicone (MYLICON) 80 MG chewable tablet  Take 1 tablet by mouth every 6 hours as needed for Flatulence  Refills: 0    vitamin D3 (CHOLECALCIFEROL) 25 MCG (1000 UT) TABS tablet  Take 2 tablets by mouth daily  Qty: 30 tablet Refills: 3    Probiotic Product (PROBIOTIC-10 PO)  Take by mouth    Multiple Vitamin (MULTIVITAMIN ADULT PO)  Take by mouth          Current Discharge Medication List    STOP taking these medications    ferrous sulfate (IRON 325) 325 (65 Fe) MG tablet  Comments:  Reason for Stopping:          Time Spent on Discharge:  30 minutes were spent in patient examination, evaluation, counseling as well as medication reconciliation, prescriptions for required medications, discharge plan, and follow up.     Electronically signed by Brook Perez MD on 9/7/22 at 10:53 AM EDT

## 2025-08-27 ENCOUNTER — HOSPITAL ENCOUNTER (EMERGENCY)
Age: 40
Discharge: HOME HEALTH | End: 2025-08-28
Attending: STUDENT IN AN ORGANIZED HEALTH CARE EDUCATION/TRAINING PROGRAM

## 2025-08-27 DIAGNOSIS — N93.8 DYSFUNCTIONAL UTERINE BLEEDING: Primary | ICD-10-CM

## 2025-08-27 LAB
ANION GAP SERPL CALCULATED.3IONS-SCNC: 14 MMOL/L (ref 7–16)
BASOPHILS # BLD: 0.04 K/UL (ref 0–0.2)
BASOPHILS NFR BLD: 0 % (ref 0–2)
BUN SERPL-MCNC: 9 MG/DL (ref 6–20)
CALCIUM SERPL-MCNC: 10.1 MG/DL (ref 8.6–10)
CHLORIDE SERPL-SCNC: 103 MMOL/L (ref 98–107)
CO2 SERPL-SCNC: 21 MMOL/L (ref 22–29)
CREAT SERPL-MCNC: 0.7 MG/DL (ref 0.5–1)
EOSINOPHIL # BLD: 0.15 K/UL (ref 0.05–0.5)
EOSINOPHILS RELATIVE PERCENT: 1 % (ref 0–6)
ERYTHROCYTE [DISTWIDTH] IN BLOOD BY AUTOMATED COUNT: 16.5 % (ref 11.5–15)
GFR, ESTIMATED: >90 ML/MIN/1.73M2
GLUCOSE SERPL-MCNC: 175 MG/DL (ref 74–99)
HCG, URINE, POC: NEGATIVE
HCT VFR BLD AUTO: 22.6 % (ref 34–48)
HGB BLD-MCNC: 7.5 G/DL (ref 11.5–15.5)
IMM GRANULOCYTES # BLD AUTO: 0.11 K/UL (ref 0–0.58)
IMM GRANULOCYTES NFR BLD: 1 % (ref 0–5)
LYMPHOCYTES NFR BLD: 3.13 K/UL (ref 1.5–4)
LYMPHOCYTES RELATIVE PERCENT: 30 % (ref 20–42)
Lab: NORMAL
MAGNESIUM SERPL-MCNC: 2 MG/DL (ref 1.6–2.6)
MCH RBC QN AUTO: 29.6 PG (ref 26–35)
MCHC RBC AUTO-ENTMCNC: 33.2 G/DL (ref 32–34.5)
MCV RBC AUTO: 89.3 FL (ref 80–99.9)
MONOCYTES NFR BLD: 0.75 K/UL (ref 0.1–0.95)
MONOCYTES NFR BLD: 7 % (ref 2–12)
NEGATIVE QC PASS/FAIL: NORMAL
NEUTROPHILS NFR BLD: 60 % (ref 43–80)
NEUTS SEG NFR BLD: 6.35 K/UL (ref 1.8–7.3)
PLATELET # BLD AUTO: 202 K/UL (ref 130–450)
PMV BLD AUTO: 10.5 FL (ref 7–12)
POSITIVE QC PASS/FAIL: NORMAL
POTASSIUM SERPL-SCNC: 3.3 MMOL/L (ref 3.5–5.1)
RBC # BLD AUTO: 2.53 M/UL (ref 3.5–5.5)
SODIUM SERPL-SCNC: 138 MMOL/L (ref 136–145)
WBC OTHER # BLD: 10.5 K/UL (ref 4.5–11.5)

## 2025-08-27 PROCEDURE — 99285 EMERGENCY DEPT VISIT HI MDM: CPT

## 2025-08-27 PROCEDURE — 83735 ASSAY OF MAGNESIUM: CPT

## 2025-08-27 PROCEDURE — 86901 BLOOD TYPING SEROLOGIC RH(D): CPT

## 2025-08-27 PROCEDURE — 86850 RBC ANTIBODY SCREEN: CPT

## 2025-08-27 PROCEDURE — 80048 BASIC METABOLIC PNL TOTAL CA: CPT

## 2025-08-27 PROCEDURE — 85025 COMPLETE CBC W/AUTO DIFF WBC: CPT

## 2025-08-27 PROCEDURE — 86900 BLOOD TYPING SEROLOGIC ABO: CPT

## 2025-08-27 PROCEDURE — 96360 HYDRATION IV INFUSION INIT: CPT

## 2025-08-27 PROCEDURE — 2580000003 HC RX 258

## 2025-08-27 PROCEDURE — 86923 COMPATIBILITY TEST ELECTRIC: CPT

## 2025-08-27 RX ORDER — 0.9 % SODIUM CHLORIDE 0.9 %
1000 INTRAVENOUS SOLUTION INTRAVENOUS ONCE
Status: COMPLETED | OUTPATIENT
Start: 2025-08-27 | End: 2025-08-27

## 2025-08-27 RX ORDER — SODIUM CHLORIDE 9 MG/ML
INJECTION, SOLUTION INTRAVENOUS PRN
Status: DISCONTINUED | OUTPATIENT
Start: 2025-08-27 | End: 2025-08-28 | Stop reason: HOSPADM

## 2025-08-27 RX ADMIN — SODIUM CHLORIDE 1000 ML: 0.9 INJECTION, SOLUTION INTRAVENOUS at 23:11

## 2025-08-27 ASSESSMENT — LIFESTYLE VARIABLES
HOW OFTEN DO YOU HAVE A DRINK CONTAINING ALCOHOL: MONTHLY OR LESS
HOW MANY STANDARD DRINKS CONTAINING ALCOHOL DO YOU HAVE ON A TYPICAL DAY: 1 OR 2

## 2025-08-27 ASSESSMENT — PAIN SCALES - GENERAL: PAINLEVEL_OUTOF10: 0

## 2025-08-27 ASSESSMENT — PAIN - FUNCTIONAL ASSESSMENT: PAIN_FUNCTIONAL_ASSESSMENT: 0-10

## 2025-08-28 ENCOUNTER — APPOINTMENT (OUTPATIENT)
Dept: ULTRASOUND IMAGING | Age: 40
End: 2025-08-28

## 2025-08-28 VITALS
OXYGEN SATURATION: 100 % | BODY MASS INDEX: 43.4 KG/M2 | HEIGHT: 69 IN | DIASTOLIC BLOOD PRESSURE: 66 MMHG | RESPIRATION RATE: 14 BRPM | TEMPERATURE: 98.5 F | SYSTOLIC BLOOD PRESSURE: 117 MMHG | HEART RATE: 85 BPM | WEIGHT: 293 LBS

## 2025-08-28 LAB
HCT VFR BLD AUTO: 21.6 % (ref 34–48)
HCT VFR BLD AUTO: 23.9 % (ref 34–48)
HGB BLD-MCNC: 6.9 G/DL (ref 11.5–15.5)
HGB BLD-MCNC: 8 G/DL (ref 11.5–15.5)

## 2025-08-28 PROCEDURE — 85014 HEMATOCRIT: CPT

## 2025-08-28 PROCEDURE — 6370000000 HC RX 637 (ALT 250 FOR IP): Performed by: STUDENT IN AN ORGANIZED HEALTH CARE EDUCATION/TRAINING PROGRAM

## 2025-08-28 PROCEDURE — P9016 RBC LEUKOCYTES REDUCED: HCPCS

## 2025-08-28 PROCEDURE — 76830 TRANSVAGINAL US NON-OB: CPT

## 2025-08-28 PROCEDURE — 85018 HEMOGLOBIN: CPT

## 2025-08-28 PROCEDURE — 6370000000 HC RX 637 (ALT 250 FOR IP): Performed by: EMERGENCY MEDICINE

## 2025-08-28 RX ORDER — ACETAMINOPHEN 500 MG
1000 TABLET ORAL ONCE
Status: COMPLETED | OUTPATIENT
Start: 2025-08-28 | End: 2025-08-28

## 2025-08-28 RX ORDER — TRANEXAMIC ACID 650 MG/1
1300 TABLET ORAL 3 TIMES DAILY
Qty: 60 TABLET | Refills: 0 | Status: SHIPPED | OUTPATIENT
Start: 2025-08-28 | End: 2025-09-07

## 2025-08-28 RX ORDER — FERROUS SULFATE 325(65) MG
325 TABLET ORAL 2 TIMES DAILY
Qty: 60 TABLET | Refills: 0 | Status: SHIPPED | OUTPATIENT
Start: 2025-08-28

## 2025-08-28 RX ORDER — SODIUM CHLORIDE 9 MG/ML
INJECTION, SOLUTION INTRAVENOUS PRN
Status: DISCONTINUED | OUTPATIENT
Start: 2025-08-28 | End: 2025-08-28 | Stop reason: HOSPADM

## 2025-08-28 RX ADMIN — ACETAMINOPHEN 1000 MG: 500 TABLET ORAL at 08:03

## 2025-08-28 RX ADMIN — POTASSIUM BICARBONATE 20 MEQ: 782 TABLET, EFFERVESCENT ORAL at 05:26

## 2025-08-28 ASSESSMENT — ENCOUNTER SYMPTOMS
GASTROINTESTINAL NEGATIVE: 1
ALLERGIC/IMMUNOLOGIC NEGATIVE: 1
RESPIRATORY NEGATIVE: 1
EYES NEGATIVE: 1

## 2025-08-28 ASSESSMENT — PAIN DESCRIPTION - LOCATION: LOCATION: HEAD

## 2025-08-28 ASSESSMENT — PAIN - FUNCTIONAL ASSESSMENT
PAIN_FUNCTIONAL_ASSESSMENT: 0-10

## 2025-08-28 ASSESSMENT — PAIN DESCRIPTION - ORIENTATION: ORIENTATION: MID

## 2025-08-28 ASSESSMENT — PAIN SCALES - GENERAL
PAINLEVEL_OUTOF10: 0
PAINLEVEL_OUTOF10: 0
PAINLEVEL_OUTOF10: 4
PAINLEVEL_OUTOF10: 0

## 2025-08-28 ASSESSMENT — PAIN DESCRIPTION - DESCRIPTORS: DESCRIPTORS: ACHING

## 2025-08-29 LAB
ABO/RH: NORMAL
ANTIBODY SCREEN: NEGATIVE
ARM BAND NUMBER: NORMAL
BLOOD BANK BLOOD PRODUCT EXPIRATION DATE: NORMAL
BLOOD BANK BLOOD PRODUCT EXPIRATION DATE: NORMAL
BLOOD BANK DISPENSE STATUS: NORMAL
BLOOD BANK DISPENSE STATUS: NORMAL
BLOOD BANK ISBT PRODUCT BLOOD TYPE: 6200
BLOOD BANK ISBT PRODUCT BLOOD TYPE: 6200
BLOOD BANK PRODUCT CODE: NORMAL
BLOOD BANK PRODUCT CODE: NORMAL
BLOOD BANK SAMPLE EXPIRATION: NORMAL
BLOOD BANK UNIT TYPE AND RH: NORMAL
BLOOD BANK UNIT TYPE AND RH: NORMAL
BPU ID: NORMAL
BPU ID: NORMAL
COMPONENT: NORMAL
COMPONENT: NORMAL
CROSSMATCH RESULT: NORMAL
CROSSMATCH RESULT: NORMAL
TRANSFUSION STATUS: NORMAL
TRANSFUSION STATUS: NORMAL
UNIT DIVISION: 0
UNIT DIVISION: 0
UNIT ISSUE DATE/TIME: NORMAL
UNIT ISSUE DATE/TIME: NORMAL

## (undated) DEVICE — CONTAINER,SPEC,PNEUM TUBE,3OZ,STRL PATH: Brand: MEDLINE

## (undated) DEVICE — Device: Brand: PORTEX

## (undated) DEVICE — TUBING, SUCTION, 3/16" X 12', STRAIGHT: Brand: MEDLINE

## (undated) DEVICE — PEN: MARKING STD 100/CS: Brand: MEDICAL ACTION INDUSTRIES

## (undated) DEVICE — TUBE BLD COLLECT ST 1 SIL COAT 7ML 10ML

## (undated) DEVICE — SUTURE PLN GUT SZ 3-0 L27IN ABSRB YELLOWISH TAN L36MM CT-1 842H

## (undated) DEVICE — SPONGE LAP W18XL18IN WHT COT 4 PLY FLD STRUNG RADPQ DISP ST

## (undated) DEVICE — 3000CC GUARDIAN II: Brand: GUARDIAN

## (undated) DEVICE — COUNTER NDL 30 COUNT DBL MAG

## (undated) DEVICE — CESAREAN BIRTH PACK II: Brand: MEDLINE INDUSTRIES, INC.

## (undated) DEVICE — PENCIL ES L3M BTTN SWCH HOLSTER W/ BLDE ELECTRD EDGE

## (undated) DEVICE — MEDI-VAC YANKAUER SUCTION HANDLE W/BULBOUS TIP: Brand: CARDINAL HEALTH

## (undated) DEVICE — GLOVE SURG SZ 75 L12IN FNGR THK83MIL CRM POLYISOPRENE

## (undated) DEVICE — SUTURE CHROMIC GUT SZ 1 L36IN ABSRB BRN L40MM CT 1/2 CIR 915H

## (undated) DEVICE — ELECTRODE PT RET AD L9FT HI MOIST COND ADH HYDRGEL CORDED

## (undated) DEVICE — HYPODERMIC SAFETY NEEDLE: Brand: MAGELLAN

## (undated) DEVICE — CATHETERIZATION KIT FOL16 FR 2000 CC DRAINAGE BG LUBRICATH

## (undated) DEVICE — GLOVE SURG SZ 65 L12IN FNGR THK83MIL CRM POLYISOPRENE

## (undated) DEVICE — SUTURE VCRL + SZ 3-0 L27IN ABSRB UD L26MM SH 1/2 CIR VCP416H

## (undated) DEVICE — GOWN,SIRUS,FABRNF,L,20/CS: Brand: MEDLINE

## (undated) DEVICE — SUTURE STRATAFIX SYMMETRIC SZ 1 L18IN ABSRB VLT CT1 L36CM SXPP1A404

## (undated) DEVICE — GOWN,SIRUS,POLYRNF,BRTHSLV,XLN/XL,20/CS: Brand: MEDLINE

## (undated) DEVICE — COVER,LIGHT HANDLE,FLX,2/PK: Brand: MEDLINE INDUSTRIES, INC.

## (undated) DEVICE — TOWEL,OR,DSP,ST,BLUE,STD,6/PK,12PK/CS: Brand: MEDLINE

## (undated) DEVICE — CONTAINER SPEC 64OZ POLYPR PATH SNAP LOK CAP W/ LID

## (undated) DEVICE — SUTURE VCRL + SZ 3-0 L36IN ABSRB UD L36MM CT-1 1/2 CIR VCP944H

## (undated) DEVICE — SHEET,DRAPE,53X77,STERILE: Brand: MEDLINE

## (undated) DEVICE — SUTURE CHROMIC GUT SZ 2-0 L36IN ABSRB BRN L36MM CT-1 1/2 923H

## (undated) DEVICE — BLADE SURG NO20 S STL STR DISP GLASSVAN

## (undated) DEVICE — APPLICATOR PREP 26ML 0.7% IOD POVACRYLEX 74% ISO ALC ST